# Patient Record
Sex: MALE | Race: ASIAN | NOT HISPANIC OR LATINO | Employment: PART TIME | URBAN - METROPOLITAN AREA
[De-identification: names, ages, dates, MRNs, and addresses within clinical notes are randomized per-mention and may not be internally consistent; named-entity substitution may affect disease eponyms.]

---

## 2017-01-25 ENCOUNTER — OFFICE VISIT - HEALTHEAST (OUTPATIENT)
Dept: FAMILY MEDICINE | Facility: CLINIC | Age: 37
End: 2017-01-25

## 2017-01-25 DIAGNOSIS — K58.0 IRRITABLE BOWEL SYNDROME WITH DIARRHEA: ICD-10-CM

## 2017-01-25 ASSESSMENT — MIFFLIN-ST. JEOR: SCORE: 1665.61

## 2017-02-03 ENCOUNTER — OFFICE VISIT - HEALTHEAST (OUTPATIENT)
Dept: FAMILY MEDICINE | Facility: CLINIC | Age: 37
End: 2017-02-03

## 2017-02-03 DIAGNOSIS — J02.0 STREP PHARYNGITIS: ICD-10-CM

## 2017-02-03 DIAGNOSIS — J02.9 SORETHROAT: ICD-10-CM

## 2017-02-03 ASSESSMENT — MIFFLIN-ST. JEOR: SCORE: 1633.62

## 2017-03-20 ENCOUNTER — OFFICE VISIT - HEALTHEAST (OUTPATIENT)
Dept: FAMILY MEDICINE | Facility: CLINIC | Age: 37
End: 2017-03-20

## 2017-03-20 DIAGNOSIS — R11.2 NON-INTRACTABLE VOMITING WITH NAUSEA, UNSPECIFIED VOMITING TYPE: ICD-10-CM

## 2017-04-21 ENCOUNTER — OFFICE VISIT - HEALTHEAST (OUTPATIENT)
Dept: FAMILY MEDICINE | Facility: CLINIC | Age: 37
End: 2017-04-21

## 2017-04-21 ENCOUNTER — COMMUNICATION - HEALTHEAST (OUTPATIENT)
Dept: FAMILY MEDICINE | Facility: CLINIC | Age: 37
End: 2017-04-21

## 2017-04-21 DIAGNOSIS — A04.8 H. PYLORI INFECTION: ICD-10-CM

## 2017-04-21 ASSESSMENT — MIFFLIN-ST. JEOR: SCORE: 1666.36

## 2017-06-13 ENCOUNTER — COMMUNICATION - HEALTHEAST (OUTPATIENT)
Dept: FAMILY MEDICINE | Facility: CLINIC | Age: 37
End: 2017-06-13

## 2017-06-26 ENCOUNTER — OFFICE VISIT - HEALTHEAST (OUTPATIENT)
Dept: FAMILY MEDICINE | Facility: CLINIC | Age: 37
End: 2017-06-26

## 2017-06-26 DIAGNOSIS — K58.0 IRRITABLE BOWEL SYNDROME WITH DIARRHEA: ICD-10-CM

## 2017-06-26 DIAGNOSIS — R10.10 PAIN OF UPPER ABDOMEN: ICD-10-CM

## 2017-06-26 ASSESSMENT — MIFFLIN-ST. JEOR: SCORE: 1664.12

## 2017-06-27 ENCOUNTER — HOSPITAL ENCOUNTER (OUTPATIENT)
Dept: ULTRASOUND IMAGING | Facility: HOSPITAL | Age: 37
Discharge: HOME | End: 2017-06-27
Attending: FAMILY MEDICINE

## 2017-06-27 DIAGNOSIS — R10.10 PAIN OF UPPER ABDOMEN: ICD-10-CM

## 2017-06-30 ENCOUNTER — OFFICE VISIT - HEALTHEAST (OUTPATIENT)
Dept: FAMILY MEDICINE | Facility: CLINIC | Age: 37
End: 2017-06-30

## 2017-06-30 DIAGNOSIS — R10.10 PAIN OF UPPER ABDOMEN: ICD-10-CM

## 2017-06-30 DIAGNOSIS — R73.09 ELEVATED GLUCOSE: ICD-10-CM

## 2017-06-30 LAB — HBA1C MFR BLD: 6 % (ref 3.5–6)

## 2017-06-30 ASSESSMENT — MIFFLIN-ST. JEOR: SCORE: 1661.85

## 2017-09-13 ENCOUNTER — OFFICE VISIT - HEALTHEAST (OUTPATIENT)
Dept: FAMILY MEDICINE | Facility: CLINIC | Age: 37
End: 2017-09-13

## 2017-09-13 DIAGNOSIS — Z11.1 SCREENING FOR TUBERCULOSIS: ICD-10-CM

## 2017-09-13 ASSESSMENT — MIFFLIN-ST. JEOR: SCORE: 1573.4

## 2017-10-13 ENCOUNTER — OFFICE VISIT - HEALTHEAST (OUTPATIENT)
Dept: FAMILY MEDICINE | Facility: CLINIC | Age: 37
End: 2017-10-13

## 2017-10-13 DIAGNOSIS — Z23 NEED FOR IMMUNIZATION AGAINST INFLUENZA: ICD-10-CM

## 2017-10-13 DIAGNOSIS — R76.12 POSITIVE QUANTIFERON-TB GOLD TEST: ICD-10-CM

## 2017-10-13 DIAGNOSIS — R73.09 ELEVATED GLUCOSE: ICD-10-CM

## 2017-10-13 LAB — HBA1C MFR BLD: 5.5 % (ref 3.5–6)

## 2017-10-13 ASSESSMENT — MIFFLIN-ST. JEOR: SCORE: 1589.28

## 2017-11-08 ENCOUNTER — OFFICE VISIT - HEALTHEAST (OUTPATIENT)
Dept: FAMILY MEDICINE | Facility: CLINIC | Age: 37
End: 2017-11-08

## 2017-11-08 DIAGNOSIS — R79.89 ELEVATED LFTS: ICD-10-CM

## 2017-11-08 DIAGNOSIS — Z23 NEED FOR VACCINATION: ICD-10-CM

## 2017-11-08 ASSESSMENT — MIFFLIN-ST. JEOR: SCORE: 1599.48

## 2018-09-07 ENCOUNTER — COMMUNICATION - HEALTHEAST (OUTPATIENT)
Dept: FAMILY MEDICINE | Facility: CLINIC | Age: 38
End: 2018-09-07

## 2018-09-07 DIAGNOSIS — K58.0 IRRITABLE BOWEL SYNDROME WITH DIARRHEA: ICD-10-CM

## 2018-09-10 ENCOUNTER — COMMUNICATION - HEALTHEAST (OUTPATIENT)
Dept: FAMILY MEDICINE | Facility: CLINIC | Age: 38
End: 2018-09-10

## 2018-10-19 ENCOUNTER — COMMUNICATION - HEALTHEAST (OUTPATIENT)
Dept: SCHEDULING | Facility: CLINIC | Age: 38
End: 2018-10-19

## 2018-10-25 ENCOUNTER — COMMUNICATION - HEALTHEAST (OUTPATIENT)
Dept: TELEHEALTH | Facility: CLINIC | Age: 38
End: 2018-10-25

## 2018-10-25 ENCOUNTER — OFFICE VISIT - HEALTHEAST (OUTPATIENT)
Dept: FAMILY MEDICINE | Facility: CLINIC | Age: 38
End: 2018-10-25

## 2018-10-25 DIAGNOSIS — K58.0 IRRITABLE BOWEL SYNDROME WITH DIARRHEA: ICD-10-CM

## 2018-10-25 DIAGNOSIS — K29.70 GASTRITIS: ICD-10-CM

## 2018-10-25 DIAGNOSIS — R14.0 BLOATING SYMPTOM: ICD-10-CM

## 2018-10-25 DIAGNOSIS — L70.9 ACNE: ICD-10-CM

## 2018-10-25 DIAGNOSIS — R79.89 ELEVATED LFTS: ICD-10-CM

## 2018-10-25 LAB
ALBUMIN SERPL-MCNC: 4.2 G/DL (ref 3.5–5)
ALP SERPL-CCNC: 65 U/L (ref 45–120)
ALT SERPL W P-5'-P-CCNC: 75 U/L (ref 0–45)
AST SERPL W P-5'-P-CCNC: 32 U/L (ref 0–40)
BASOPHILS # BLD AUTO: 0 THOU/UL (ref 0–0.2)
BASOPHILS NFR BLD AUTO: 1 % (ref 0–2)
BILIRUB DIRECT SERPL-MCNC: 0.2 MG/DL
BILIRUB SERPL-MCNC: 0.7 MG/DL (ref 0–1)
EOSINOPHIL # BLD AUTO: 0.3 THOU/UL (ref 0–0.4)
EOSINOPHIL NFR BLD AUTO: 4 % (ref 0–6)
ERYTHROCYTE [DISTWIDTH] IN BLOOD BY AUTOMATED COUNT: 12 % (ref 11–14.5)
HCT VFR BLD AUTO: 50.3 % (ref 40–54)
HGB BLD-MCNC: 16.1 G/DL (ref 14–18)
LYMPHOCYTES # BLD AUTO: 1.8 THOU/UL (ref 0.8–4.4)
LYMPHOCYTES NFR BLD AUTO: 24 % (ref 20–40)
MCH RBC QN AUTO: 25.3 PG (ref 27–34)
MCHC RBC AUTO-ENTMCNC: 32.1 G/DL (ref 32–36)
MCV RBC AUTO: 79 FL (ref 80–100)
MONOCYTES # BLD AUTO: 0.4 THOU/UL (ref 0–0.9)
MONOCYTES NFR BLD AUTO: 5 % (ref 2–10)
NEUTROPHILS # BLD AUTO: 4.9 THOU/UL (ref 2–7.7)
NEUTROPHILS NFR BLD AUTO: 65 % (ref 50–70)
PLATELET # BLD AUTO: 165 THOU/UL (ref 140–440)
PMV BLD AUTO: 9.6 FL (ref 7–10)
PROT SERPL-MCNC: 7.7 G/DL (ref 6–8)
RBC # BLD AUTO: 6.38 MILL/UL (ref 4.4–6.2)
WBC: 7.5 THOU/UL (ref 4–11)

## 2018-10-26 ENCOUNTER — AMBULATORY - HEALTHEAST (OUTPATIENT)
Dept: LAB | Facility: CLINIC | Age: 38
End: 2018-10-26

## 2018-10-26 DIAGNOSIS — K58.0 IRRITABLE BOWEL SYNDROME WITH DIARRHEA: ICD-10-CM

## 2018-10-26 LAB
C DIFF TOX B STL QL: NEGATIVE
RIBOTYPE 027/NAP1/BI: NORMAL

## 2018-10-27 LAB
SHIGA TOXIN 1: NEGATIVE
SHIGA TOXIN 2: NEGATIVE

## 2018-10-29 LAB
BACTERIA SPEC CULT: NORMAL
H PYLORI AG STL QL IA: NORMAL
O+P STL MICRO: NORMAL
O+P STL MICRO: NORMAL
REPORT STATUS: NORMAL
SPECIMEN DESCRIPTION: NORMAL

## 2018-11-01 ENCOUNTER — OFFICE VISIT - HEALTHEAST (OUTPATIENT)
Dept: FAMILY MEDICINE | Facility: CLINIC | Age: 38
End: 2018-11-01

## 2018-11-01 DIAGNOSIS — R00.0 TACHYCARDIA: ICD-10-CM

## 2018-11-01 DIAGNOSIS — K58.0 IRRITABLE BOWEL SYNDROME WITH DIARRHEA: ICD-10-CM

## 2018-11-01 DIAGNOSIS — R79.89 ELEVATED LFTS: ICD-10-CM

## 2018-11-01 DIAGNOSIS — R06.00 DYSPNEA: ICD-10-CM

## 2018-11-01 LAB
ATRIAL RATE - MUSE: 95 BPM
DIASTOLIC BLOOD PRESSURE - MUSE: NORMAL MMHG
INTERPRETATION ECG - MUSE: NORMAL
P AXIS - MUSE: 71 DEGREES
PR INTERVAL - MUSE: 160 MS
QRS DURATION - MUSE: 80 MS
QT - MUSE: 332 MS
QTC - MUSE: 417 MS
R AXIS - MUSE: 79 DEGREES
SYSTOLIC BLOOD PRESSURE - MUSE: NORMAL MMHG
T AXIS - MUSE: 50 DEGREES
TSH SERPL DL<=0.005 MIU/L-ACNC: 0.9 UIU/ML (ref 0.3–5)
VENTRICULAR RATE- MUSE: 95 BPM

## 2018-11-01 ASSESSMENT — MIFFLIN-ST. JEOR: SCORE: 1596.65

## 2018-11-02 ENCOUNTER — RECORDS - HEALTHEAST (OUTPATIENT)
Dept: ADMINISTRATIVE | Facility: OTHER | Age: 38
End: 2018-11-02

## 2018-11-06 ENCOUNTER — RECORDS - HEALTHEAST (OUTPATIENT)
Dept: ADMINISTRATIVE | Facility: OTHER | Age: 38
End: 2018-11-06

## 2018-11-12 ENCOUNTER — RECORDS - HEALTHEAST (OUTPATIENT)
Dept: ADMINISTRATIVE | Facility: OTHER | Age: 38
End: 2018-11-12

## 2018-11-13 ENCOUNTER — COMMUNICATION - HEALTHEAST (OUTPATIENT)
Dept: FAMILY MEDICINE | Facility: CLINIC | Age: 38
End: 2018-11-13

## 2018-11-27 ENCOUNTER — OFFICE VISIT - HEALTHEAST (OUTPATIENT)
Dept: FAMILY MEDICINE | Facility: CLINIC | Age: 38
End: 2018-11-27

## 2018-11-27 DIAGNOSIS — R53.82 CHRONIC FATIGUE: ICD-10-CM

## 2018-11-27 DIAGNOSIS — R79.89 ABNORMAL LIVER FUNCTION TESTS: ICD-10-CM

## 2018-11-27 DIAGNOSIS — R06.02 SOB (SHORTNESS OF BREATH): ICD-10-CM

## 2018-11-27 DIAGNOSIS — R21 RASH: ICD-10-CM

## 2018-11-27 DIAGNOSIS — R06.2 WHEEZING: ICD-10-CM

## 2018-11-27 LAB
ALBUMIN SERPL-MCNC: 4.5 G/DL (ref 3.5–5)
ALP SERPL-CCNC: 86 U/L (ref 45–120)
ALT SERPL W P-5'-P-CCNC: 90 U/L (ref 0–45)
ANION GAP SERPL CALCULATED.3IONS-SCNC: 10 MMOL/L (ref 5–18)
AST SERPL W P-5'-P-CCNC: 41 U/L (ref 0–40)
BASOPHILS # BLD AUTO: 0 THOU/UL (ref 0–0.2)
BASOPHILS NFR BLD AUTO: 0 % (ref 0–2)
BILIRUB SERPL-MCNC: 1.3 MG/DL (ref 0–1)
BUN SERPL-MCNC: 11 MG/DL (ref 8–22)
CALCIUM SERPL-MCNC: 10.4 MG/DL (ref 8.5–10.5)
CHLORIDE BLD-SCNC: 105 MMOL/L (ref 98–107)
CO2 SERPL-SCNC: 27 MMOL/L (ref 22–31)
CREAT SERPL-MCNC: 1.02 MG/DL (ref 0.7–1.3)
EOSINOPHIL # BLD AUTO: 0.2 THOU/UL (ref 0–0.4)
EOSINOPHIL NFR BLD AUTO: 4 % (ref 0–6)
ERYTHROCYTE [DISTWIDTH] IN BLOOD BY AUTOMATED COUNT: 14.6 % (ref 11–14.5)
FERRITIN SERPL-MCNC: 238 NG/ML (ref 27–300)
GFR SERPL CREATININE-BSD FRML MDRD: >60 ML/MIN/1.73M2
GLUCOSE BLD-MCNC: 90 MG/DL (ref 70–125)
HBA1C MFR BLD: 5.7 % (ref 3.5–6)
HCT VFR BLD AUTO: 56.3 % (ref 40–54)
HGB BLD-MCNC: 17.9 G/DL (ref 14–18)
LYMPHOCYTES # BLD AUTO: 0.9 THOU/UL (ref 0.8–4.4)
LYMPHOCYTES NFR BLD AUTO: 18 % (ref 20–40)
MCH RBC QN AUTO: 25.1 PG (ref 27–34)
MCHC RBC AUTO-ENTMCNC: 31.8 G/DL (ref 32–36)
MCV RBC AUTO: 79 FL (ref 80–100)
MONOCYTES # BLD AUTO: 0.4 THOU/UL (ref 0–0.9)
MONOCYTES NFR BLD AUTO: 8 % (ref 2–10)
NEUTROPHILS # BLD AUTO: 3.7 THOU/UL (ref 2–7.7)
NEUTROPHILS NFR BLD AUTO: 71 % (ref 50–70)
PLATELET # BLD AUTO: 230 THOU/UL (ref 140–440)
PMV BLD AUTO: 11.2 FL (ref 8.5–12.5)
POTASSIUM BLD-SCNC: 3.9 MMOL/L (ref 3.5–5)
PROT SERPL-MCNC: 8.4 G/DL (ref 6–8)
RBC # BLD AUTO: 7.12 MILL/UL (ref 4.4–6.2)
SODIUM SERPL-SCNC: 142 MMOL/L (ref 136–145)
TSH SERPL DL<=0.005 MIU/L-ACNC: 0.63 UIU/ML (ref 0.3–5)
WBC: 5.2 THOU/UL (ref 4–11)

## 2018-11-27 ASSESSMENT — MIFFLIN-ST. JEOR: SCORE: 1592.48

## 2018-11-29 LAB
A ALTERNATA IGE QN: <0.35 KU/L
A FUMIGATUS IGE QN: <0.35 KU/L
ALLERGEN HOUSE DUST GREER: <0.35 KU/L
ALLERGEN HOUSE DUST HOLLISTER: <0.35 KU/L
ALLERGEN HOUSE DUST HOLLISTER: <0.35 KU/L
C HERBARUM IGE QN: <0.35 KU/L
CAT DANDER IGG QN: <0.35 KU/L
COCKSFOOT IGE QN: <0.35 KU/L
COMMON RAGWEED IGE QN: <0.35 KU/L
D FARINAE IGE QN: <0.35 KU/L
DOG DANDER+EPITH IGE QN: <0.35 KU/L
ENGL PLANTAIN IGE QN: <0.35 KU/L
GIANT RAGWEED IGE QN: <0.35 KU/L
KENT BLUE GRASS IGE QN: <0.35 KU/L
LTX IGE QN: <0.35 KU/L
MAPLE IGE QN: <0.35 KU/L
TIMOTHY IGE QN: <0.35 KU/L
WHITE ELM IGE QN: <0.35 KU/L
WHITE OAK IGE QN: <0.35 KU/L

## 2018-12-05 ENCOUNTER — TELEPHONE (OUTPATIENT)
Dept: OTHER | Facility: CLINIC | Age: 38
End: 2018-12-05

## 2018-12-24 ENCOUNTER — COMMUNICATION - HEALTHEAST (OUTPATIENT)
Dept: SCHEDULING | Facility: CLINIC | Age: 38
End: 2018-12-24

## 2018-12-24 ENCOUNTER — COMMUNICATION - HEALTHEAST (OUTPATIENT)
Dept: FAMILY MEDICINE | Facility: CLINIC | Age: 38
End: 2018-12-24

## 2018-12-24 DIAGNOSIS — R06.2 WHEEZING: ICD-10-CM

## 2018-12-24 DIAGNOSIS — R06.02 SOB (SHORTNESS OF BREATH): ICD-10-CM

## 2018-12-26 ENCOUNTER — COMMUNICATION - HEALTHEAST (OUTPATIENT)
Dept: SCHEDULING | Facility: CLINIC | Age: 38
End: 2018-12-26

## 2018-12-31 ENCOUNTER — OFFICE VISIT - HEALTHEAST (OUTPATIENT)
Dept: FAMILY MEDICINE | Facility: CLINIC | Age: 38
End: 2018-12-31

## 2018-12-31 DIAGNOSIS — R11.11 NON-INTRACTABLE VOMITING WITHOUT NAUSEA, UNSPECIFIED VOMITING TYPE: ICD-10-CM

## 2018-12-31 ASSESSMENT — MIFFLIN-ST. JEOR: SCORE: 1573.97

## 2019-01-03 ENCOUNTER — OFFICE VISIT - HEALTHEAST (OUTPATIENT)
Dept: FAMILY MEDICINE | Facility: CLINIC | Age: 39
End: 2019-01-03

## 2019-01-03 DIAGNOSIS — K58.0 IRRITABLE BOWEL SYNDROME WITH DIARRHEA: ICD-10-CM

## 2019-01-03 DIAGNOSIS — R79.89 ABNORMAL LIVER FUNCTION TESTS: ICD-10-CM

## 2019-01-03 DIAGNOSIS — J98.01 BRONCHOSPASM: ICD-10-CM

## 2019-01-03 DIAGNOSIS — G47.00 INSOMNIA, UNSPECIFIED TYPE: ICD-10-CM

## 2019-01-03 ASSESSMENT — MIFFLIN-ST. JEOR: SCORE: 1578.51

## 2019-02-26 ENCOUNTER — OFFICE VISIT - HEALTHEAST (OUTPATIENT)
Dept: FAMILY MEDICINE | Facility: CLINIC | Age: 39
End: 2019-02-26

## 2019-02-26 DIAGNOSIS — J02.9 SORE THROAT: ICD-10-CM

## 2019-02-26 DIAGNOSIS — J02.0 ACUTE STREPTOCOCCAL PHARYNGITIS: ICD-10-CM

## 2019-02-26 LAB — DEPRECATED S PYO AG THROAT QL EIA: ABNORMAL

## 2019-02-26 ASSESSMENT — MIFFLIN-ST. JEOR: SCORE: 1557.5

## 2019-03-27 ENCOUNTER — OFFICE VISIT - HEALTHEAST (OUTPATIENT)
Dept: FAMILY MEDICINE | Facility: CLINIC | Age: 39
End: 2019-03-27

## 2019-03-27 DIAGNOSIS — L70.9 ACNE: ICD-10-CM

## 2019-03-27 DIAGNOSIS — R11.2 NON-INTRACTABLE VOMITING WITH NAUSEA, UNSPECIFIED VOMITING TYPE: ICD-10-CM

## 2019-03-27 LAB
ALBUMIN SERPL-MCNC: 4.6 G/DL (ref 3.5–5)
ALP SERPL-CCNC: 71 U/L (ref 45–120)
ALT SERPL W P-5'-P-CCNC: 46 U/L (ref 0–45)
AST SERPL W P-5'-P-CCNC: 26 U/L (ref 0–40)
BILIRUB DIRECT SERPL-MCNC: 0.3 MG/DL
BILIRUB SERPL-MCNC: 1 MG/DL (ref 0–1)
ERYTHROCYTE [DISTWIDTH] IN BLOOD BY AUTOMATED COUNT: 11.3 % (ref 11–14.5)
HCT VFR BLD AUTO: 52 % (ref 40–54)
HGB BLD-MCNC: 17 G/DL (ref 14–18)
LIPASE SERPL-CCNC: 15 U/L (ref 0–52)
MCH RBC QN AUTO: 25.5 PG (ref 27–34)
MCHC RBC AUTO-ENTMCNC: 32.6 G/DL (ref 32–36)
MCV RBC AUTO: 78 FL (ref 80–100)
PLATELET # BLD AUTO: 188 THOU/UL (ref 140–440)
PMV BLD AUTO: 9 FL (ref 7–10)
PROT SERPL-MCNC: 8.2 G/DL (ref 6–8)
RBC # BLD AUTO: 6.65 MILL/UL (ref 4.4–6.2)
WBC: 4.4 THOU/UL (ref 4–11)

## 2019-03-27 ASSESSMENT — MIFFLIN-ST. JEOR: SCORE: 1550.72

## 2019-04-12 ENCOUNTER — RECORDS - HEALTHEAST (OUTPATIENT)
Dept: GENERAL RADIOLOGY | Facility: CLINIC | Age: 39
End: 2019-04-12

## 2019-04-12 ENCOUNTER — OFFICE VISIT - HEALTHEAST (OUTPATIENT)
Dept: FAMILY MEDICINE | Facility: CLINIC | Age: 39
End: 2019-04-12

## 2019-04-12 DIAGNOSIS — R11.2 NON-INTRACTABLE VOMITING WITH NAUSEA, UNSPECIFIED VOMITING TYPE: ICD-10-CM

## 2019-04-12 DIAGNOSIS — K58.0 IRRITABLE BOWEL SYNDROME WITH DIARRHEA: ICD-10-CM

## 2019-04-12 DIAGNOSIS — R10.84 GENERALIZED ABDOMINAL PAIN: ICD-10-CM

## 2019-04-12 DIAGNOSIS — R14.0 BLOATING SYMPTOM: ICD-10-CM

## 2019-04-12 DIAGNOSIS — R10.84 ABDOMINAL PAIN, GENERALIZED: ICD-10-CM

## 2019-04-12 DIAGNOSIS — R19.7 DIARRHEA, UNSPECIFIED: ICD-10-CM

## 2019-04-12 DIAGNOSIS — R79.89 ABNORMAL LIVER FUNCTION TESTS: ICD-10-CM

## 2019-04-12 DIAGNOSIS — R19.7 VOMITING AND DIARRHEA: ICD-10-CM

## 2019-04-12 DIAGNOSIS — R14.0 ABDOMINAL DISTENSION (GASEOUS): ICD-10-CM

## 2019-04-12 DIAGNOSIS — R11.10 VOMITING AND DIARRHEA: ICD-10-CM

## 2019-04-12 DIAGNOSIS — R11.10 VOMITING, UNSPECIFIED: ICD-10-CM

## 2019-04-12 LAB
ALBUMIN SERPL-MCNC: 4.8 G/DL (ref 3.5–5)
ALBUMIN UR-MCNC: NEGATIVE MG/DL
ALP SERPL-CCNC: 68 U/L (ref 45–120)
ALT SERPL W P-5'-P-CCNC: 77 U/L (ref 0–45)
AMYLASE SERPL-CCNC: 80 U/L (ref 5–120)
ANION GAP SERPL CALCULATED.3IONS-SCNC: 14 MMOL/L (ref 5–18)
APPEARANCE UR: CLEAR
AST SERPL W P-5'-P-CCNC: 42 U/L (ref 0–40)
BASOPHILS # BLD AUTO: 0 THOU/UL (ref 0–0.2)
BASOPHILS NFR BLD AUTO: 1 % (ref 0–2)
BILIRUB SERPL-MCNC: 1 MG/DL (ref 0–1)
BILIRUB UR QL STRIP: NEGATIVE
BUN SERPL-MCNC: 13 MG/DL (ref 8–22)
C REACTIVE PROTEIN LHE: <0.1 MG/DL (ref 0–0.8)
CALCIUM SERPL-MCNC: 10.3 MG/DL (ref 8.5–10.5)
CHLORIDE BLD-SCNC: 103 MMOL/L (ref 98–107)
CO2 SERPL-SCNC: 24 MMOL/L (ref 22–31)
COLOR UR AUTO: YELLOW
CREAT SERPL-MCNC: 1.05 MG/DL (ref 0.7–1.3)
EOSINOPHIL # BLD AUTO: 0.2 THOU/UL (ref 0–0.4)
EOSINOPHIL NFR BLD AUTO: 2 % (ref 0–6)
ERYTHROCYTE [DISTWIDTH] IN BLOOD BY AUTOMATED COUNT: 13.5 % (ref 11–14.5)
ERYTHROCYTE [SEDIMENTATION RATE] IN BLOOD BY WESTERGREN METHOD: 2 MM/HR (ref 0–15)
GFR SERPL CREATININE-BSD FRML MDRD: >60 ML/MIN/1.73M2
GLUCOSE BLD-MCNC: 107 MG/DL (ref 70–125)
GLUCOSE UR STRIP-MCNC: NEGATIVE MG/DL
HCT VFR BLD AUTO: 52 % (ref 40–54)
HGB BLD-MCNC: 16.4 G/DL (ref 14–18)
HGB UR QL STRIP: NEGATIVE
KETONES UR STRIP-MCNC: ABNORMAL MG/DL
LEUKOCYTE ESTERASE UR QL STRIP: NEGATIVE
LYMPHOCYTES # BLD AUTO: 1.5 THOU/UL (ref 0.8–4.4)
LYMPHOCYTES NFR BLD AUTO: 23 % (ref 20–40)
MCH RBC QN AUTO: 25.2 PG (ref 27–34)
MCHC RBC AUTO-ENTMCNC: 31.5 G/DL (ref 32–36)
MCV RBC AUTO: 80 FL (ref 80–100)
MONOCYTES # BLD AUTO: 0.4 THOU/UL (ref 0–0.9)
MONOCYTES NFR BLD AUTO: 6 % (ref 2–10)
NEUTROPHILS # BLD AUTO: 4.6 THOU/UL (ref 2–7.7)
NEUTROPHILS NFR BLD AUTO: 69 % (ref 50–70)
NITRATE UR QL: NEGATIVE
PH UR STRIP: 5.5 [PH] (ref 5–8)
PLATELET # BLD AUTO: 215 THOU/UL (ref 140–440)
PMV BLD AUTO: 12.1 FL (ref 8.5–12.5)
POTASSIUM BLD-SCNC: 3.4 MMOL/L (ref 3.5–5)
PROT SERPL-MCNC: 8.2 G/DL (ref 6–8)
RBC # BLD AUTO: 6.51 MILL/UL (ref 4.4–6.2)
SODIUM SERPL-SCNC: 141 MMOL/L (ref 136–145)
SP GR UR STRIP: 1.02 (ref 1–1.03)
UROBILINOGEN UR STRIP-ACNC: ABNORMAL
WBC: 6.7 THOU/UL (ref 4–11)

## 2019-04-12 ASSESSMENT — MIFFLIN-ST. JEOR: SCORE: 1525.77

## 2019-04-15 ENCOUNTER — COMMUNICATION - HEALTHEAST (OUTPATIENT)
Dept: FAMILY MEDICINE | Facility: CLINIC | Age: 39
End: 2019-04-15

## 2019-04-15 DIAGNOSIS — K58.0 IRRITABLE BOWEL SYNDROME WITH DIARRHEA: ICD-10-CM

## 2019-04-15 DIAGNOSIS — R63.4 WEIGHT LOSS: ICD-10-CM

## 2019-04-16 ENCOUNTER — AMBULATORY - HEALTHEAST (OUTPATIENT)
Dept: LAB | Facility: CLINIC | Age: 39
End: 2019-04-16

## 2019-04-16 DIAGNOSIS — R63.4 WEIGHT LOSS: ICD-10-CM

## 2019-04-16 DIAGNOSIS — K58.0 IRRITABLE BOWEL SYNDROME WITH DIARRHEA: ICD-10-CM

## 2019-04-17 LAB
CLAM IGE QN: <0.35 KU/L
EGG WHITE IGE QN: <0.35 KU/L
LOBSTER IGE QN: <0.35 KU/L
PEANUT IGE QN: <0.35 KU/L
SALMON IGE QN: <0.35 KU/L
SCALLOP IGE QN: <0.35 KU/L
SHRIMP IGE QN: <0.35 KU/L
TUNA IGE QN: <0.35 KU/L
WHEAT IGE QN: <0.35 KU/L
WHOLE EGG IGE QN: <0.35 KU/L

## 2019-04-18 LAB
GLIADIN IGA SER-ACNC: 0.9 U/ML
GLIADIN IGG SER-ACNC: <0.4 U/ML
IGA SERPL-MCNC: 227 MG/DL (ref 65–400)
TTG IGA SER-ACNC: 0.3 U/ML
TTG IGG SER-ACNC: <0.6 U/ML

## 2019-04-19 LAB — MILK IGE QN: <0.35 KU/L

## 2019-04-20 LAB — COW WHEY IGE QN: <0.1 KU(A)/L

## 2019-04-26 ENCOUNTER — RECORDS - HEALTHEAST (OUTPATIENT)
Dept: ADMINISTRATIVE | Facility: OTHER | Age: 39
End: 2019-04-26

## 2019-05-03 ENCOUNTER — OFFICE VISIT - HEALTHEAST (OUTPATIENT)
Dept: FAMILY MEDICINE | Facility: CLINIC | Age: 39
End: 2019-05-03

## 2019-05-03 DIAGNOSIS — F51.01 PRIMARY INSOMNIA: ICD-10-CM

## 2019-05-03 ASSESSMENT — MIFFLIN-ST. JEOR: SCORE: 1543.92

## 2019-05-05 ENCOUNTER — COMMUNICATION - HEALTHEAST (OUTPATIENT)
Dept: FAMILY MEDICINE | Facility: CLINIC | Age: 39
End: 2019-05-05

## 2019-05-17 ENCOUNTER — COMMUNICATION - HEALTHEAST (OUTPATIENT)
Dept: NURSING | Facility: CLINIC | Age: 39
End: 2019-05-17

## 2019-05-20 ENCOUNTER — AMBULATORY - HEALTHEAST (OUTPATIENT)
Dept: CARE COORDINATION | Facility: CLINIC | Age: 39
End: 2019-05-20

## 2019-05-20 ENCOUNTER — COMMUNICATION - HEALTHEAST (OUTPATIENT)
Dept: NURSING | Facility: CLINIC | Age: 39
End: 2019-05-20

## 2019-05-20 DIAGNOSIS — Z59.71 INSURANCE COVERAGE PROBLEMS: ICD-10-CM

## 2019-05-21 ENCOUNTER — COMMUNICATION - HEALTHEAST (OUTPATIENT)
Dept: CARE COORDINATION | Facility: CLINIC | Age: 39
End: 2019-05-21

## 2019-06-05 ENCOUNTER — COMMUNICATION - HEALTHEAST (OUTPATIENT)
Dept: CARE COORDINATION | Facility: CLINIC | Age: 39
End: 2019-06-05

## 2019-06-09 ENCOUNTER — HOSPITAL ENCOUNTER (EMERGENCY)
Facility: CLINIC | Age: 39
Discharge: HOME | End: 2019-06-10
Attending: EMERGENCY MEDICINE | Admitting: EMERGENCY MEDICINE
Payer: COMMERCIAL

## 2019-06-09 ENCOUNTER — TRANSFERRED RECORDS (OUTPATIENT)
Dept: HEALTH INFORMATION MANAGEMENT | Facility: CLINIC | Age: 39
End: 2019-06-09

## 2019-06-09 DIAGNOSIS — T17.208A FOREIGN BODY IN PHARYNX, INITIAL ENCOUNTER: ICD-10-CM

## 2019-06-09 PROCEDURE — 31577 LARGSC W/RMVL FOREIGN BDY(S): CPT | Performed by: EMERGENCY MEDICINE

## 2019-06-09 PROCEDURE — 99284 EMERGENCY DEPT VISIT MOD MDM: CPT | Mod: Z6 | Performed by: EMERGENCY MEDICINE

## 2019-06-09 PROCEDURE — 99284 EMERGENCY DEPT VISIT MOD MDM: CPT | Mod: 25 | Performed by: EMERGENCY MEDICINE

## 2019-06-09 RX ORDER — DICYCLOMINE HCL 20 MG
20 TABLET ORAL
COMMUNITY

## 2019-06-09 NOTE — ED AVS SNAPSHOT
[facility], [place], Emergency Department  [address] [address]  Phone:  [phone]                                     Cassy   MRN: [id number]    Department:  [facility], Emergency Department   Date of Visit:  6/9/2019           After Visit Summary Signature Page    I have received my discharge instructions, and my questions have been answered. I have discussed any challenges I see with this plan with the nurse or doctor.    ..........................................................................................................................................  Patient/Patient Representative Signature      ..........................................................................................................................................  Patient Representative Print Name and Relationship to Patient    ..................................................               ................................................  Date                                   Time    ..........................................................................................................................................  Reviewed by Signature/Title    ...................................................              ..............................................  Date                                               Time          22EPIC Rev 08/18

## 2019-06-10 VITALS
OXYGEN SATURATION: 97 % | DIASTOLIC BLOOD PRESSURE: 74 MMHG | TEMPERATURE: 98.8 F | BODY MASS INDEX: 26.31 KG/M2 | WEIGHT: 158.1 LBS | HEART RATE: 90 BPM | SYSTOLIC BLOOD PRESSURE: 111 MMHG

## 2019-06-10 RX ORDER — LIDOCAINE HYDROCHLORIDE 10 MG/ML
INJECTION, SOLUTION EPIDURAL; INFILTRATION; INTRACAUDAL; PERINEURAL
Status: DISCONTINUED
Start: 2019-06-10 | End: 2019-06-10 | Stop reason: HOSPADM

## 2019-06-10 ASSESSMENT — ENCOUNTER SYMPTOMS
FEVER: 0
VOMITING: 0
ABDOMINAL PAIN: 0
SHORTNESS OF BREATH: 0

## 2019-06-10 NOTE — ED TRIAGE NOTES
Pt states his neck hurts when he swallows water he was evaluated at [facility] already today. Sent here for ENT maricruz

## 2019-06-10 NOTE — ED PROVIDER NOTES
History     Chief Complaint   Patient presents with     Airway Obstruction     HPI  Tobias Madera is a 39 year old male who was sent over from an outside ED with a foreign body in his throat, apparently 1 day ago he ate chicken, felt like something stuck in his throat. He s been having constant discomfort since then and often times is spitting his saliva secondary to this. No difficulty breathing. No vomiting. No other complaints. He did have a soft tissue neck x-ray at the outside facility which showed a curvilinear density extending form the aryepiglottic fold to the posterior pharynx. Measuring approximately 2 mm in diameter and 3.8 cm in length. Apparently outside ENT did not feel comfortable retrieving this presumed chicken bone and he was sent here for evaluation by out ENT.   This part of the medical record was transcribed by Cecily Byrne, Medical Scribe, from a dictation done by Dr. Tejeda.  I have reviewed the Medications, Allergies, Past Medical and Surgical History, and Social History in the [employer] system.  History reviewed. No pertinent past medical history.    History reviewed. No pertinent surgical history.    History reviewed. No pertinent family history.    Social History     Tobacco Use     Smoking status: Former Smoker     Smokeless tobacco: Never Used   Substance Use Topics     Alcohol use: No       No current facility-administered medications for this encounter.      Current Outpatient Medications   Medication     amitriptyline (ELAVIL) 25 MG tablet     amoxicillin-clavulanate (AUGMENTIN) 875-125 MG tablet     dicyclomine (BENTYL) 20 MG tablet      No Known Allergies    Review of Systems   Constitutional: Negative for fever.   HENT:        Throat discomfort   Respiratory: Negative for shortness of breath.    Cardiovascular: Negative for chest pain.   Gastrointestinal: Negative for abdominal pain and vomiting.   All other systems reviewed and are negative.      Physical Exam   BP: 122/81  Pulse:  106  Temp: 98.8  F (37.1  C)  Weight: 71.7 kg (158 lb 1.6 oz)  SpO2: 99 %      Physical Exam   Constitutional: He appears distressed (Looks uncomfortable).   HENT:   Head: Atraumatic.   Mouth/Throat: Oropharynx is clear and moist.   Eyes: No scleral icterus.   Cardiovascular: Normal heart sounds and intact distal pulses.   Pulmonary/Chest: Breath sounds normal. No respiratory distress.   Abdominal: Soft. There is no tenderness.   Musculoskeletal: He exhibits no edema or tenderness.   Skin: Skin is warm. No rash noted. He is not diaphoretic.       ED Course        Procedures             Critical Care time:  none      Please see ENT note for full details of procedure.  I did supervise the entire procedure.       Labs Ordered and Resulted from Time of ED Arrival Up to the Time of Departure from the ED - No data to display         Assessments & Plan (with Medical Decision Making)   ENT did come to see the patient, was able to remove the chicken bone at bedside.  It did seem to be lodged in the base of the tongue, so we will cover with Augmentin for 7 days.  The patient's symptoms have resolved and he is feeling well at this point.  No complications.  He will be discharged home, given ENT phone number he should follow-up with them as needed.  He is encouraged to return to the ER for new or worsening symptoms.  He verbalizes understanding is agreeable to plan.    Dictation Disclaimer: Some of this Note has been completed with voice-recognition dictation software. Although errors are generally corrected real-time, there is the potential for a rare error to be present in the completed chart.      I have reviewed the nursing notes.    I have reviewed the findings, diagnosis, plan and need for follow up with the patient.       Medication List      Started    amoxicillin-clavulanate 875-125 MG tablet  Commonly known as:  AUGMENTIN  1 tablet, Oral, 2 TIMES DAILY            Final diagnoses:   Foreign body in pharynx, initial  encounter       6/9/2019   [facility], [place], EMERGENCY DEPARTMENT     Kimberley Tejeda MD  06/10/19 0254

## 2019-06-10 NOTE — CONSULTS
Otolaryngology Consult Note  Marsha 10, 2019      CC: sore throat, globus    HPI: Tobias Madera is a 39 year old male with history of insomnia and chronic diarrhea presents as transfer from [facility] ED for evaluation and management of an oropharynx foreign body. Patient reports he was eating chicken for dinner the evening of June 8th when he felt something stick in his throat. He continued on with this sensation, but developed worsening pain with swallowing to the point where he was spitting out his secretions. He has not had any trouble breathing, No fevers, chills, or sweats. Patient was seen at [facility] ED where AP and lateral neck films were notable for a radiopaque FB consistent with chicken bone.     History reviewed. No pertinent past medical history.    History reviewed. No pertinent surgical history.    Current Outpatient Medications   Medication Sig Dispense Refill     amitriptyline (ELAVIL) 25 MG tablet Take 25 mg by mouth       dicyclomine (BENTYL) 20 MG tablet Take 20 mg by mouth          No Known Allergies    Social History     Socioeconomic History     Marital status:      Spouse name: Not on file     Number of children: Not on file     Years of education: Not on file     Highest education level: Not on file   Occupational History     Not on file   Social Needs     Financial resource strain: Not on file     Food insecurity:     Worry: Not on file     Inability: Not on file     Transportation needs:     Medical: Not on file     Non-medical: Not on file   Tobacco Use     Smoking status: Former Smoker     Smokeless tobacco: Never Used   Substance and Sexual Activity     Alcohol use: No     Drug use: Not on file     Sexual activity: Not on file   Lifestyle     Physical activity:     Days per week: Not on file     Minutes per session: Not on file     Stress: Not on file   Relationships     Social connections:     Talks on phone: Not on file     Gets together: Not on file     Attends [employer] service: Not  on file     Active member of club or organization: Not on file     Attends meetings of clubs or organizations: Not on file     Relationship status: Not on file     Intimate partner violence:     Fear of current or ex partner: Not on file     Emotionally abused: Not on file     Physically abused: Not on file     Forced sexual activity: Not on file   Other Topics Concern     Not on file   Social History Narrative     Not on file       History reviewed. No pertinent family history.    ROS: 12 point review of systems is negative unless noted in HPI.    PHYSICAL EXAM:  General: laying in bed, no acute distress  /81   Pulse 103   Temp 98.8  F (37.1  C) (Oral)   Wt 71.7 kg (158 lb 1.6 oz)   SpO2 100%   BMI 26.31 kg/m    HEAD: normocephalic, atraumatic  Face: symmetrical, no swelling, edema, or erythema, no facial droop  Eyes: EOMI without spontaneous or gaze evoked nystagmus, PERRL, clear sclera  Ears: no tragal tenderness, external ear canal open and clear bilaterally, TMs clear bilaterally  Nose: no anterior drainage, intact and midline septum without perforation or hematoma   Mouth: moist, no ulcers, no jaw or tooth tenderness, tongue midline and symmetric  Oropharynx: tonsils within normal limits, uvula midline, no oropharyngeal erythema  Neck: no LAD, trach midline  Neuro: cranial nerves 2-12 grossly intact    FIBEROPTIC ENDOSCOPY AND FOREIGN BODY REMOVAL:  Due to foreign body, fiberoptic laryngoscopy was indicated. After obtaining verbal consent, the nose was topically decongested and anesthetized. The fiberoptic laryngoscope was passed under endoscopic vision through the left nasal passage. The turbinates were normal. The inferior and middle meati were clear bilaterally without purulence, masses, or polyps. The nasopharynx was clear. The eustachian tubes were clear. The soft palate appeared normal with good mobility. The epiglottis was sharp, and the visualized portion of the vallecula was clear. There is  a chicken bone lodged in the tongue base and against the posterior pharyngeal wall. The larynx was clear with mobile cords. The arytenoids were clear, and there was no pooling in the hypopharynx. The oropharynx was then anesthetized topically with 1% lidocaine. With the assistance of Dr. Tejeda, ED attending, driving the scope, a Yazan forceps was used to grasp the anterior part of the chicken bone and extract it from the oropharynx. THere was minimal bleeding, The upper airway was again examined and there was no further evidence of foreign bodies. Patient tolerated this procedure well.     ROUTINE IP LABS (Last four results)  BMPNo lab results found in last 7 days.  CBCNo lab results found in last 7 days.  INRNo lab results found in last 7 days.    Imaging:  Outside AP and lateral neck films:  EXAM: XR NECK SOFT TISSUE    LOCATION: [facility]    DATE/TIME: 6/9/2019 7:01 PM        INDICATION: possible swallowed foreign body    COMPARISON: None.        FINDINGS: No prevertebral edema. Normal appearance of the epiglottis and larynx. No airway compromise. A curvilinear density is seen extending from the aryepiglottic fold to the posterior pharynx. It measures approximately 2 mm in diameter and 3.8 cm in     length.        Assessment and Plan  Tobias Madera is a 39 year old male with a past medical history noted above who presents with a chicken bone foreign body in the oropharynx. This was removed in the ED with topical anesthesia.   - Patient to be monitored for 30-60 minutes post FB removal  - Recommend 5 days Augmentin due to duration of foreign body > 24 hours  - Instructed patient to follow a soft diet for the next 48-72 hours  - Patient instructed to return to ED with worsening pain, chest pain, fevers, etc.   - follow up with ENT FITO Johnson MD PGY4  Otolaryngology-Head & Neck Surgery  Please page ENT with questions by dialing [phone] and entering job code [phone] when prompted.

## 2019-07-22 ENCOUNTER — RECORDS - HEALTHEAST (OUTPATIENT)
Dept: ADMINISTRATIVE | Facility: OTHER | Age: 39
End: 2019-07-22

## 2019-09-03 ENCOUNTER — OFFICE VISIT - HEALTHEAST (OUTPATIENT)
Dept: FAMILY MEDICINE | Facility: CLINIC | Age: 39
End: 2019-09-03

## 2019-09-03 DIAGNOSIS — F51.01 PRIMARY INSOMNIA: ICD-10-CM

## 2019-09-03 ASSESSMENT — PATIENT HEALTH QUESTIONNAIRE - PHQ9: SUM OF ALL RESPONSES TO PHQ QUESTIONS 1-9: 2

## 2019-09-03 ASSESSMENT — MIFFLIN-ST. JEOR: SCORE: 1584.44

## 2019-12-16 ENCOUNTER — OFFICE VISIT - HEALTHEAST (OUTPATIENT)
Dept: FAMILY MEDICINE | Facility: CLINIC | Age: 39
End: 2019-12-16

## 2019-12-16 DIAGNOSIS — J02.9 SORE THROAT: ICD-10-CM

## 2019-12-16 LAB — DEPRECATED S PYO AG THROAT QL EIA: NORMAL

## 2019-12-16 ASSESSMENT — MIFFLIN-ST. JEOR: SCORE: 1627.27

## 2019-12-17 LAB — GROUP A STREP BY PCR: NORMAL

## 2020-01-29 ENCOUNTER — COMMUNICATION - HEALTHEAST (OUTPATIENT)
Dept: FAMILY MEDICINE | Facility: CLINIC | Age: 40
End: 2020-01-29

## 2020-01-29 DIAGNOSIS — K58.0 IRRITABLE BOWEL SYNDROME WITH DIARRHEA: ICD-10-CM

## 2020-05-02 ENCOUNTER — COMMUNICATION - HEALTHEAST (OUTPATIENT)
Dept: FAMILY MEDICINE | Facility: CLINIC | Age: 40
End: 2020-05-02

## 2020-05-02 DIAGNOSIS — F51.01 PRIMARY INSOMNIA: ICD-10-CM

## 2020-05-05 ENCOUNTER — COMMUNICATION - HEALTHEAST (OUTPATIENT)
Dept: SCHEDULING | Facility: CLINIC | Age: 40
End: 2020-05-05

## 2020-05-07 ENCOUNTER — COMMUNICATION - HEALTHEAST (OUTPATIENT)
Dept: SCHEDULING | Facility: CLINIC | Age: 40
End: 2020-05-07

## 2020-05-19 ENCOUNTER — COMMUNICATION - HEALTHEAST (OUTPATIENT)
Dept: EMERGENCY MEDICINE | Facility: HOSPITAL | Age: 40
End: 2020-05-19

## 2020-07-15 DIAGNOSIS — Z11.59 SCREENING FOR VIRAL DISEASE: ICD-10-CM

## 2021-02-11 ENCOUNTER — COMMUNICATION - HEALTHEAST (OUTPATIENT)
Dept: FAMILY MEDICINE | Facility: CLINIC | Age: 41
End: 2021-02-11

## 2021-02-12 ENCOUNTER — OFFICE VISIT - HEALTHEAST (OUTPATIENT)
Dept: FAMILY MEDICINE | Facility: CLINIC | Age: 41
End: 2021-02-12

## 2021-02-12 DIAGNOSIS — Z11.1 SCREENING EXAMINATION FOR PULMONARY TUBERCULOSIS: ICD-10-CM

## 2021-02-12 ASSESSMENT — MIFFLIN-ST. JEOR: SCORE: 1665.22

## 2021-02-17 LAB
GAMMA INTERFERON BACKGROUND BLD IA-ACNC: 0.12 IU/ML
M TB IFN-G BLD-IMP: POSITIVE
MITOGEN IGNF BCKGRD COR BLD-ACNC: 0.38 IU/ML
MITOGEN IGNF BCKGRD COR BLD-ACNC: 0.44 IU/ML
QTF INTERPRETATION: ABNORMAL
QTF MITOGEN - NIL: >10 IU/ML

## 2021-05-26 ASSESSMENT — PATIENT HEALTH QUESTIONNAIRE - PHQ9: SUM OF ALL RESPONSES TO PHQ QUESTIONS 1-9: 2

## 2021-05-27 NOTE — TELEPHONE ENCOUNTER
Please call  Cassy. Ask him to return for a lab-only visit. Since his first labs were normal or very close to normal, I would like to do additional testing for allergies/sensitivities plus celiac. He can come any time the lab is open and get these done.

## 2021-05-27 NOTE — PROGRESS NOTES
ASSESSMENT/PLAN:    1. Non-intractable vomiting with nausea, unspecified vomiting type  Patient has irritable bowel at baseline, but it sounds like these symptoms are quite a bit worse and different than his usual.  I was concerned about pancreatitis or other new disorder contributing to his symptoms, so I did check labs as noted but they were all came back ultimately normal.  Given his history of nonspecific gastritis on EGD, he may benefit from a brief course of ranitidine and this is so prescribed.  I would like him to follow-up with with his primary physician in about 1 month to ensure stability/improvement.  - ranitidine (ZANTAC) 300 MG capsule; Take 1 capsule (300 mg total) by mouth 2 (two) times a day.  Dispense: 28 capsule; Refill: 1  - Hepatic Profile  - HM2(CBC w/o Differential)  - Lipase    2. Acne  Refilled topical acne medicine for his request.  - clindamycin (CLINDAGEL) 1 % gel; Apply to affected area 2 times daily  Dispense: 60 g; Refill: 2      Return in about 1 month (around 4/27/2019) for with your primary care doctor.     SUBJECTIVE:  Tobias Madera is a 39 y.o. man with irritable bowel syndrome who presents with fatigue, nausea, stomach pain, vomiting.    Patient had not been feeling well in the last couple of weeks feeling really tired, his hands and feet were cold, having some nausea.  More more recently a couple of days ago he vomited and thought that maybe he was getting worse and should be seen.  He gets some stomach burning sometimes which causes him trouble breathing through his nose at night and so he only can be through his mouth.  Gets pain in the mid and left upper abdomen.  His sleep is poor due to the trouble breathing.  Of note, he did have an inhaler at one point with possible bronchospasm as well, but he has not used that very recently.  He does use sometimes.    Has had some diarrhea recently and has had this off and on in the past as well.  He has a prescription for Bentyl that he takes  "on an as-needed basis and that helps.    He has been eating less than normal and very bland foods like soup and that seems to be helping.    Records reviewed: He is seen [facility] and had upper and lower endoscopies within the last 6 months.  There was an inlet patch in his esophagus and nonspecific gastritis of the stomach.  Extensive lab testing was negative apparently.    The following portions of the patient's history were reviewed and updated as appropriate: allergies, current medications and problem list  Medication Sig     cetirizine (ZYRTEC) 10 MG tablet Take 1 tablet (10 mg total) by mouth daily.     dicyclomine (BENTYL) 20 mg tablet Take 1 tablet (20 mg total) by mouth 3 (three) times a day.     VENTOLIN HFA 90 mcg/actuation inhaler INHALE TWO PUFFS BY MOUTH EVERY SIX HOURS AS NEEDED FOR WHEEZING     clindamycin (CLINDAGEL) 1 % gel Apply to affected area 2 times daily     hydrocortisone 1 % lotion Apply to affected skin 3 times daily as needed. (Patient not taking: Reported on 3/27/2019)     psyllium husk, with sugar, 3.4 gram/7 gram Powd 1 tablespoon in 8 oz water daily (Patient not taking: Reported on 3/27/2019)     No current facility-administered medications on file prior to visit.         Social History     Tobacco Use   Smoking Status Former Smoker     Types: Cigarettes     Last attempt to quit: 10/31/2016     Years since quittin.4   Smokeless Tobacco Current User     Types: Chew       OBJECTIVE:  :  /68   Pulse 98   Temp 97.4  F (36.3  C) (Oral)   Resp 20   Ht 5' 5.75\" (1.67 m)   Wt 154 lb 12 oz (70.2 kg)   SpO2 97%   BMI 25.17 kg/m      Gen:  A&A, NAD  HEENT: Oropharynx pink moist and benign.  Neck:  supple, no sig LAD or thyromegally  CV:  HRRR, no M/R/G  Resp:  CTAB  Abd:  S&NT, no mass  Ext:  W&D, no edema      Lab results:    Office Visit on 2019   Component Date Value Ref Range Status     Bilirubin, Total 2019 1.0  0.0 - 1.0 mg/dL Final     " Bilirubin, Direct 03/27/2019 0.3  <=0.5 mg/dL Final     Protein, Total 03/27/2019 8.2* 6.0 - 8.0 g/dL Final     Albumin 03/27/2019 4.6  3.5 - 5.0 g/dL Final     Alkaline Phosphatase 03/27/2019 71  45 - 120 U/L Final     AST 03/27/2019 26  0 - 40 U/L Final     ALT 03/27/2019 46* 0 - 45 U/L Final     WBC 03/27/2019 4.4  4.0 - 11.0 thou/uL Final     RBC 03/27/2019 6.65* 4.40 - 6.20 mill/uL Final     Hemoglobin 03/27/2019 17.0  14.0 - 18.0 g/dL Final     Hematocrit 03/27/2019 52.0  40.0 - 54.0 % Final     MCV 03/27/2019 78* 80 - 100 fL Final     MCH 03/27/2019 25.5* 27.0 - 34.0 pg Final     MCHC 03/27/2019 32.6  32.0 - 36.0 g/dL Final     RDW 03/27/2019 11.3  11.0 - 14.5 % Final     Platelets 03/27/2019 188  140 - 440 thou/uL Final     MPV 03/27/2019 9.0  7.0 - 10.0 fL Final     Lipase 03/27/2019 15  0 - 52 U/L Final

## 2021-05-27 NOTE — PROGRESS NOTES
OFFICE VISIT NOTE      Assessment & Plan   Eh Cassy is a 39 y.o. male with some unusual abd issues that persist. I am guessing there is more than one thing going on here. He seems to have some GERD, but I also wonder about gall bladder malfunction? With his bowels, does he have a parasite or other infection? He agrees to try metronidazole for presumptive treatment of giardia.  I have not asked about any trauma or abuse history.  Meanwhile he'll eat more small meals - to try to maintain weight.   i'll likely need to refer him back to GI.    Diagnoses and all orders for this visit:    Abdominal pain, generalized  -     Urinalysis-UC if Indicated  -     HM1(CBC and Differential)  -     Comprehensive Metabolic Panel  -     XR Abdomen AP; Future; Expected date: 04/12/2019  -     Amylase  -     XR Chest 2 Views  -     HM1 (CBC with Diff)  -     Erythrocyte Sedimentation Rate  -     C-Reactive Protein  -     Celiac(Gluten)Antibody Panel    Bloating symptom  -     Urinalysis-UC if Indicated  -     HM1(CBC and Differential)  -     Comprehensive Metabolic Panel  -     XR Abdomen AP; Future; Expected date: 04/12/2019  -     Amylase  -     XR Chest 2 Views  -     HM1 (CBC with Diff)  -     Celiac(Gluten)Antibody Panel    Vomiting and diarrhea  -     Urinalysis-UC if Indicated  -     HM1(CBC and Differential)  -     Comprehensive Metabolic Panel  -     XR Abdomen AP; Future; Expected date: 04/12/2019  -     Amylase  -     XR Chest 2 Views  -     HM1 (CBC with Diff)  -     Celiac(Gluten)Antibody Panel  -     metroNIDAZOLE (FLAGYL) 500 MG tablet; Take 1 tablet (500 mg total) by mouth 3 (three) times a day for 7 days.  Dispense: 21 tablet; Refill: 0    Non-intractable vomiting with nausea, unspecified vomiting type  -     ranitidine (ZANTAC) 300 MG capsule; Take 1 capsule (300 mg total) by mouth 2 (two) times a day.  Dispense: 28 capsule; Refill: 1    Abnormal liver function tests    Irritable bowel syndrome with  diarrhea        Vandana Avina MD              Subjective:   Chief Complaint:  Abdominal Pain (pt states burning sensation in stomach & noises, bloating, difficulty breathing sometimes); Fatigue; Nausea; Muscle Pain; and cold hands and feet    39 y.o. male.     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     ranitidine (ZANTAC) 300 MG capsule Take 1 capsule (300 mg total) by mouth 2 (two) times a day.     cetirizine (ZYRTEC) 10 MG tablet Take 1 tablet (10 mg total) by mouth daily.     clindamycin (CLINDAGEL) 1 % gel Apply to affected area 2 times daily     hydrocortisone 1 % lotion Apply to affected skin 3 times daily as needed. (Patient not taking: Reported on 3/27/2019      )     melatonin 3 mg Tab tablet Take 3 mg by mouth at bedtime as needed.     metroNIDAZOLE (FLAGYL) 500 MG tablet Take 1 tablet (500 mg total) by mouth 3 (three) times a day for 7 days.     psyllium husk, with sugar, 3.4 gram/7 gram Powd 1 tablespoon in 8 oz water daily (Patient not taking: Reported on 3/27/2019      )     VENTOLIN HFA 90 mcg/actuation inhaler INHALE TWO PUFFS BY MOUTH EVERY SIX HOURS AS NEEDED FOR WHEEZING       PSFHx: appropriate sections of PMH completed/filled in   Tobacco Status:  He  reports that he quit smoking about 2 years ago. His smoking use included cigarettes. His  "smokeless tobacco use includes chew.    Review of Systems:  No fever.  No rash. All other systems negative except as noted above.    Objective:    /76   Pulse (!) 107   Temp 97.9  F (36.6  C) (Oral)   Resp 12   Ht 5' 5.75\" (1.67 m)   Wt 149 lb 4 oz (67.7 kg)   SpO2 98%   BMI 24.27 kg/m    GENERAL: No acute distress.  Ht: reg s1s2  Lungs: clear with good aeration, no wheezes even with forced aeration  Abd: +BS, soft, ND/tender in upper quadrants, no masses, no CVA tenderness, neg John's sign    Labs pending  CXR clear  KUB: ?inflammation in one part of the bowel with gas, otherwise normal    Spent 40 min face to face with patient with more the 50% spent in counseling, reviewing chart, and coordination of care and discussing abd pain issues, digestion, parasites, etc.    "

## 2021-05-27 NOTE — PATIENT INSTRUCTIONS - HE
Things you can do to improve symptoms of gastritis and/or reflux:  1.  Eat smaller, more frequent meals.  2.  Avoid irritating foods, such as those foods that are spicy, acidic, fried or fatty. Common foods that cause irritation include chocolate, tomatoes or tomato-based sauces, citrus fruits, coffee.  3.  Only drink alcohol in moderation, if at all. Excessive use of alcohol can irritate the mucous lining of your stomach.   4.  Do not smoke or use tobacco products.  5. Consider switching pain relievers. Nonsteroidal anti-inflammatory drugs (NSAIDs), such as naproxen (Aleve), aspirin, and ibuprofen (Motrin, Advil) are irritating to the stomach.  Acetaminophen (Tylenol) is safe for the stomach.  6.  Manage stress. Stress may make your gastritis symptoms worse. Stress may be unavoidable, but you can learn to cope with it. If you have trouble relaxing, consider trying calming activities, such as meditation, yoga or gray chi.

## 2021-05-28 NOTE — PROGRESS NOTES
Assessment: /    Plan:    1. Primary insomnia  amitriptyline (ELAVIL) 25 MG tablet       Begin amitriptyline for sleep.  Recheck if any problem.      Subjective:    HPI:  Tobias Madera is a 39-year-old male presenting with insomnia.  He has been taking melatonin 3 mg.  At times he feels anxious, and his hands and feet tingle.      Review of Systems: No fever or cough.      Current Outpatient Medications   Medication Sig Dispense Refill     clindamycin (CLINDAGEL) 1 % gel Apply to affected area 2 times daily 60 g 2     dicyclomine (BENTYL) 20 mg tablet Take 1 tablet (20 mg total) by mouth 3 (three) times a day. 90 tablet 6     hydrocortisone 1 % lotion Apply to affected skin 3 times daily as needed. 118 mL 1     melatonin 3 mg Tab tablet Take 3 mg by mouth at bedtime as needed.       ranitidine (ZANTAC) 300 MG capsule Take 1 capsule (300 mg total) by mouth 2 (two) times a day. 28 capsule 1     VENTOLIN HFA 90 mcg/actuation inhaler INHALE TWO PUFFS BY MOUTH EVERY SIX HOURS AS NEEDED FOR WHEEZING 18 g 5     amitriptyline (ELAVIL) 25 MG tablet Take 1 tablet (25 mg total) by mouth at bedtime. 30 tablet 11     cetirizine (ZYRTEC) 10 MG tablet Take 1 tablet (10 mg total) by mouth daily. 30 tablet 2     psyllium husk, with sugar, 3.4 gram/7 gram Powd 1 tablespoon in 8 oz water daily (Patient not taking: Reported on 3/27/2019      ) 538 g 11     No current facility-administered medications for this visit.          Objective:    Vitals:    05/03/19 1309   BP: 100/66   Pulse: 98   Resp: 19   Temp: 99.1  F (37.3  C)   SpO2: 100%       Gen:  NAD, VSS  Lungs:  normal  Heart:  normal    PHQ 9 is 6      ADDITIONAL HISTORY SUMMARIZED (2): None.  DECISION TO OBTAIN EXTRA INFORMATION (1): None.   RADIOLOGY TESTS (1): None.  LABS (1): None.  MEDICINE TESTS (1): None.  INDEPENDENT REVIEW (2 each): None.     Total Data Points: 0

## 2021-05-28 NOTE — TELEPHONE ENCOUNTER
Patient notified per provider note below. The patient will discuss coverage with specialty schedulers when they call.

## 2021-05-28 NOTE — TELEPHONE ENCOUNTER
Patient notified. Patient took Metronidazole. The medication did not help at all. Patient would like to know how to proceed?

## 2021-05-28 NOTE — TELEPHONE ENCOUNTER
"Please call Tobias Cassy. Let him know his labs came back very reassuringly. They are normal --no allergies, no celiac, etc.  This is good, but it leaves us with the question-- what is causing his symptoms? I ask him to consider if he has anxiety, depression, or a traumatic event in the past that might still be \"bothering\" him some? When he follows up with Dr. Gates in early May, he can discuss this and if Tobias Madera should go back to a GI specialist.    Has he taken the metronidazole? If so, did it help at all?  "

## 2021-05-28 NOTE — PROGRESS NOTES
Tobias Madera called the CHW back and states that he went to the [facility] office in-person and they told him they cannot help him. Tobias Madera reports he used to pay monthly insurance premiums for [facility] but has not paid them in a month or so and he states that he continues to get bills from [facility]. [facility] stated that [facility] needs to tell them he is no longer wanting the plan before they will stop sending him bills. Tobias Madera reports he has spoken with [facility] and let them know about his situation but that they have not updated [facility].    He reports he needs help applying for [facility] insurance as he is no longer working and neither is his wife.        CHW sent a message to the [facility] for a CCC referral for FRG.

## 2021-05-28 NOTE — TELEPHONE ENCOUNTER
I put in a referral (back to GI). The patient can decide if he wants to return to [facility] or try [facility] or other (per what his insurance will cover -he might need to call his insurance to find out who they cover).

## 2021-05-28 NOTE — PROGRESS NOTES
"CHW contacted Tobias Madera and he reports he continues to get bills from [facility] regarding his premiums     CHW assisted patient to call [facility] who report that they cannot \"force him to pay the premiums\", and they state that he can disregard the bills he is getting as they state it does take time for [facility] to notify them of a cancelled policy.     CHW provided Tobias Madera with the [facility] office address ([address] [place] [address], [id number]) and he states he will go down there and talk to someone in person about his insurance concerns.    W provided a call back number for the CHW ([phone]) and informed him that he can call the CHW back if he feels he wants to meet with the FRG after he goes to the [facility] office in person.    No further outreach will be done at this time by [facility]. Patient has CHW's phone number and will call back with any concerns.     "

## 2021-05-29 NOTE — PROGRESS NOTES
Programs Applying For: Health Insurance   County:  Case #:  [facility] Worker:   Hugo #:   PMI #:   Tracking:   Date Applied:     Outreach:   5/29/2019: FRG called patient to discuss health insurance and to see if he received any information from [employer]. FRG left  and will make outreach call in 1 week.  5/21/2019: FRG called patient to discuss referral. FRG and patient are going to connect in 1 week to allow time for [employer] to call patient back. Patient will either switch to Medical Assistance or we will need to reapply through [employer]. Patient has already reported new income.       Health Insurance Information:       Referral:   Hi,     Patient does speak English and did his best to resolve his insurance issues.     Patient was paying for a [employer] Bronze insurance plan but now is not working and neither is his wife. He would like help applying for health insurance now that they are both unemployed.     He does report he continues to get bills from [employer] asking him to pay his premium. He does report he has contacted [employer] about his job loss but reports getting another bill from [employer]. [facility] states that he did do what he was supposed to do and that they are waiting on [employer] to report to them that he is no longer employed and wanting the [email or web address] plan.       Thank you,     Julia

## 2021-05-29 NOTE — PROGRESS NOTES
Programs Applying For: Health Insurance   County:  Case #:  [facility] Worker:   Hugo #:   PMI #:   Tracking:   Date Applied:     Outreach:   6/5/2019: FRG checked [place], [place] [employer]. Patient will bring insurance card to clinic at next appointment. FRG called patient to confirm insurance is active. Patient was FRG only, taking patient off panel.  This subscriber is eligible for FF: [place] [facility].  Elig Type M2: [place] group II  Eligibility Begin Date: 06/01/2019  Eligibility End Date: --/--/----  This subscriber is eligible for the following service types: Medical Care , Chiropractic , Dental Care , Hospital , Hospital - Inpatient , Hospital - Outpatient , Emergency Services , Pharmacy , Professional (Physician) Visit - Office , Vision (Optometry) , Mental Health , Urgent Care   Prepaid Health Plan   This subscriber receives FF01 - [place] delivered through [employer]. The phone numbers is [phone] ().  Other Eligibility Information      CLOSED ENCOUNTER:  5/29/2019: FRG called patient to discuss health insurance and to see if he received any information from [facility]. FRG left  and will make outreach call in 1 week.  5/21/2019: FRG called patient to discuss referral. FRG and patient are going to connect in 1 week to allow time for [employer] to call patient back. Patient will either switch to Medical Assistance or we will need to reapply through [employer]. Patient has already reported new income.       Health Insurance Information:       Referral:   Hi,     Patient does speak English and did his best to resolve his insurance issues.     Patient was paying for a [employer] Bronze insurance plan but now is not working and neither is his wife. He would like help applying for health insurance now that they are both unemployed.     He does report he continues to get bills from [facility] asking him to pay his premium. He does report he has contacted [employer] about his job loss but  reports getting another bill from [facility]. [facility] states that he did do what he was supposed to do and that they are waiting on [employer] to report to them that he is no longer employed and wanting the [facility] plan.       Thank you,     Julia

## 2021-05-30 VITALS — WEIGHT: 180.25 LBS | BODY MASS INDEX: 29.08 KG/M2

## 2021-05-30 VITALS — HEIGHT: 66 IN | WEIGHT: 172.08 LBS | BODY MASS INDEX: 27.65 KG/M2

## 2021-05-30 VITALS — BODY MASS INDEX: 28.79 KG/M2 | WEIGHT: 179.12 LBS | HEIGHT: 66 IN

## 2021-05-30 VITALS — BODY MASS INDEX: 28.97 KG/M2 | WEIGHT: 180.25 LBS | HEIGHT: 66 IN

## 2021-05-31 VITALS — WEIGHT: 159.75 LBS | HEIGHT: 66 IN | BODY MASS INDEX: 25.67 KG/M2

## 2021-05-31 VITALS — BODY MASS INDEX: 28.89 KG/M2 | HEIGHT: 66 IN | WEIGHT: 179.75 LBS

## 2021-05-31 VITALS — BODY MASS INDEX: 26.6 KG/M2 | HEIGHT: 66 IN | WEIGHT: 165.5 LBS

## 2021-05-31 VITALS — WEIGHT: 163.25 LBS | BODY MASS INDEX: 26.24 KG/M2 | HEIGHT: 66 IN

## 2021-05-31 VITALS — WEIGHT: 179.25 LBS | BODY MASS INDEX: 28.81 KG/M2 | HEIGHT: 66 IN

## 2021-06-01 NOTE — PROGRESS NOTES
Assessment: /    Plan:    1. Primary insomnia         Recheck in 1 year.      Subjective:    HPI:  Eh Cassy is a 39-year-old male presenting for follow-up on insomnia.  He has been taking amitriptyline, which has been helping.    He has also been taking dicyclomine, prescribed by GI due to diarrhea.      Social Hx: He is a PCA, taking care of Saw MACKENZIE Noriega.    Review of Systems: No fever or cough.      Current Outpatient Medications   Medication Sig Dispense Refill     amitriptyline (ELAVIL) 25 MG tablet Take 1 tablet (25 mg total) by mouth at bedtime. 30 tablet 11     dicyclomine (BENTYL) 20 mg tablet Take 20 mg by mouth 3 (three) times a day as needed.       No current facility-administered medications for this visit.          Objective:    Vitals:    09/03/19 1256   BP: 106/70   Pulse: (!) 103   Resp: 20   Temp: 98.5  F (36.9  C)   SpO2: 97%       Gen:  NAD, VSS  Lungs:  normal  Heart:  normal          ADDITIONAL HISTORY SUMMARIZED (2): Reviewed GI note.  DECISION TO OBTAIN EXTRA INFORMATION (1): None.   RADIOLOGY TESTS (1): None.  LABS (1): None.  MEDICINE TESTS (1): None.  INDEPENDENT REVIEW (2 each): None.     Total Data Points: 2

## 2021-06-02 VITALS — WEIGHT: 164 LBS | BODY MASS INDEX: 26.67 KG/M2

## 2021-06-02 VITALS — BODY MASS INDEX: 26.21 KG/M2 | HEIGHT: 66 IN | WEIGHT: 163.08 LBS

## 2021-06-02 VITALS — WEIGHT: 154.75 LBS | HEIGHT: 66 IN | BODY MASS INDEX: 24.87 KG/M2

## 2021-06-02 VITALS — WEIGHT: 164 LBS | HEIGHT: 66 IN | BODY MASS INDEX: 26.36 KG/M2

## 2021-06-02 VITALS — BODY MASS INDEX: 25.11 KG/M2 | HEIGHT: 66 IN | WEIGHT: 156.25 LBS

## 2021-06-02 VITALS — BODY MASS INDEX: 23.99 KG/M2 | HEIGHT: 66 IN | WEIGHT: 149.25 LBS

## 2021-06-02 VITALS — HEIGHT: 66 IN | BODY MASS INDEX: 25.55 KG/M2 | WEIGHT: 159 LBS

## 2021-06-02 VITALS — HEIGHT: 66 IN | BODY MASS INDEX: 25.71 KG/M2 | WEIGHT: 160 LBS

## 2021-06-03 VITALS
OXYGEN SATURATION: 97 % | WEIGHT: 161.5 LBS | RESPIRATION RATE: 20 BRPM | SYSTOLIC BLOOD PRESSURE: 106 MMHG | HEART RATE: 103 BPM | TEMPERATURE: 98.5 F | BODY MASS INDEX: 25.96 KG/M2 | HEIGHT: 66 IN | DIASTOLIC BLOOD PRESSURE: 70 MMHG

## 2021-06-03 VITALS — WEIGHT: 153.25 LBS | HEIGHT: 66 IN | BODY MASS INDEX: 24.63 KG/M2

## 2021-06-04 VITALS
HEART RATE: 104 BPM | SYSTOLIC BLOOD PRESSURE: 108 MMHG | HEIGHT: 66 IN | RESPIRATION RATE: 16 BRPM | DIASTOLIC BLOOD PRESSURE: 78 MMHG | WEIGHT: 170.75 LBS | TEMPERATURE: 99.2 F | BODY MASS INDEX: 27.44 KG/M2

## 2021-06-04 NOTE — PROGRESS NOTES
"  Chief Complaint   Patient presents with     Sore Throat     for 1 week and also feeling fatigue for two days now.          HPI:   Tobias Madera is a 39 y.o. male has had had sore throat for a few days.  No fever. No ear pain.  No cough.  No stomach pain.  No medication taken.  No one else at home is sick.    ROS:  A 10 point comprehensive review of systems was negative except as noted.      Medications:  Current Outpatient Medications on File Prior to Visit   Medication Sig Dispense Refill     amitriptyline (ELAVIL) 25 MG tablet Take 1 tablet (25 mg total) by mouth at bedtime. 30 tablet 11     dicyclomine (BENTYL) 20 mg tablet Take 20 mg by mouth 3 (three) times a day as needed.       No current facility-administered medications on file prior to visit.          Social History:  Social History     Tobacco Use     Smoking status: Former Smoker     Types: Cigarettes     Last attempt to quit: 10/31/2016     Years since quitting: 3.1     Smokeless tobacco: Former User     Types: Chew   Substance Use Topics     Alcohol use: No         Physical Exam:   Vitals:    12/16/19 1721   BP: 108/78   Pulse: (!) 104   Resp: 16   Temp: 99.2  F (37.3  C)   TempSrc: Oral   Weight: 170 lb 12 oz (77.5 kg)   Height: 5' 6\" (1.676 m)       GENERAL:   Alert. Oriented.  EYES: Clear  HENT:  Ears: R TM pearly gray. Normal landmarks. L TM pearly gray.  Normal landmarks  Nose: Clear.  Sinuses: Nontender.  Oropharynx:  No erythema. No exudate.  NECK: Supple. No adenopathy.  LUNGS: Clear to ascultation.  No crackles.  No wheezing  HEART: RRR  SKIN:  No rash.      LABS:  Results for orders placed or performed in visit on 12/16/19   Rapid Strep A Screen- Throat Swab   Result Value Ref Range    Rapid Strep A Antigen No Group A Strep detected, presumptive negative No Group A Strep detected, presumptive negative        Assessment/Plan:    1. Sore throat  Rapid Strep A Screen- Throat Swab    Group A Strep, RNA Direct Detection, Throat      Viral.  Symptomatic " treatment as needed.    Recheck if worsening or not improving.            Walt Munoz MD      12/16/2019    The following portions of the patient's history were reviewed and updated as appropriate: allergies, current medications, past family history, past medical history, past social history, past surgical history and problem list.

## 2021-06-05 VITALS
BODY MASS INDEX: 28.93 KG/M2 | DIASTOLIC BLOOD PRESSURE: 64 MMHG | HEIGHT: 66 IN | WEIGHT: 180 LBS | RESPIRATION RATE: 24 BRPM | HEART RATE: 120 BPM | SYSTOLIC BLOOD PRESSURE: 120 MMHG | TEMPERATURE: 98.7 F

## 2021-06-05 NOTE — TELEPHONE ENCOUNTER
RN cannot approve Refill Request    RN can NOT refill this medication historical medication requested.      Leah Hull, [facility] Triage/Med Refill 1/29/2020    Requested Prescriptions   Pending Prescriptions Disp Refills     dicyclomine (BENTYL) 20 mg tablet [Pharmacy Med Name: Dicyclomine HCl Oral Tablet 20 MG] 90 tablet 5     Sig: Take 1 tablet (20 mg total) by mouth 3 (three) times a day.       GI Medications Refill Protocol Passed - 1/29/2020  2:43 PM        Passed - PCP or prescribing provider visit in last 12 or next 3 months.     Last office visit with prescriber/PCP: 1/3/2019 Josué Croft MD OR same dept: 12/16/2019 Walt Munoz MD OR same specialty: 12/16/2019 Walt Munoz MD  Last physical: Visit date not found Last MTM visit: Visit date not found   Next visit within 3 mo: Visit date not found  Next physical within 3 mo: Visit date not found  Prescriber OR PCP: Josué Croft MD  Last diagnosis associated with med order: There are no diagnoses linked to this encounter.  If protocol passes may refill for 12 months if within 3 months of last provider visit (or a total of 15 months).

## 2021-06-07 NOTE — TELEPHONE ENCOUNTER
Refill Approved    Rx renewed per Medication Renewal Policy. Medication was last renewed on 5/3/19.    Leah Hull, [facility] Triage/Med Refill 5/4/2020     Requested Prescriptions   Pending Prescriptions Disp Refills     amitriptyline (ELAVIL) 25 MG tablet [Pharmacy Med Name: Amitriptyline HCl Oral Tablet 25 MG] 30 tablet 0     Sig: Take 1 tablet by mouth at bedtime.       Tricyclics/Misc Antidepressant/Antianxiety Meds Refill Protocol Passed - 5/2/2020  2:49 PM        Passed - PCP or prescribing provider visit in last year     Last office visit with prescriber/PCP: 9/3/2019 Patricio Gates MD OR same dept: 12/16/2019 Walt Munoz MD OR same specialty: 12/16/2019 Walt Munoz MD  Last physical: Visit date not found Last MTM visit: Visit date not found   Next visit within 3 mo: Visit date not found  Next physical within 3 mo: Visit date not found  Prescriber OR PCP: Patricio Gates MD  Last diagnosis associated with med order: 1. Primary insomnia  - amitriptyline (ELAVIL) 25 MG tablet [Pharmacy Med Name: Amitriptyline HCl Oral Tablet 25 MG]; Take 1 tablet by mouth at bedtime.  Dispense: 30 tablet; Refill: 0    If protocol passes may refill for 12 months if within 3 months of last provider visit (or a total of 15 months).

## 2021-06-07 NOTE — TELEPHONE ENCOUNTER
"Patient calling - says he has sore throat and vomiting.  Vomited twice this morning.  Sore throat started last night.  Throat \"hurts a little bit\" when he swallows - rates pain 2/10.    No difficulty breathing.  No difficulty swallowing.  No abdominal pain.  No fever. No blood or black coffee ground material in vomit.    Triaged to disposition of Home Care.  Care advice given per protocol: Sip warm chicken broth or warm apple juice, such on hard candy or throat lozenge, gargle with salt water four times a day and avoid cigarette smoke for sore throat relief.  Drink enough to stay hydrated.  Sip small amounts of liquid frequently for 8 hours rather than trying to drink a lot of liquid all at one time to prevent vomiting.  After 8 hours with no vomiting, a bland footds such as saltine crackers, white bread, rice, cereal.    Advised to call back if vomiting last more than 2 days, abdominal pain occurs, signs of dehydration occur, throat pain lasts more than 2 days with no cold symptoms or he becomes worse.    Naima You RN  Triage Nurse Advisor    COVID 19 Nurse Triage Plan/Patient Instructions    Please be aware that novel coronavirus (COVID-19) may be circulating in the community. If you develop symptoms such as fever, cough, or SOB or if you have concerns about the presence of another infection including coronavirus (COVID-19), please contact your health care provider or visit [email or web address].     Disposition/Instructions    Patient to stay at home and follow home care protocol based instructions.    Thank you for limiting contact with others, wearing a simple mask to cover your cough, practice good hand hygiene habits and accessing our [facility] services where possible to limit the spread of this virus.    For more information about COVID19 and options for caring for yourself at home, please visit the CDC website at https://www.cdc.gov/coronavirus/2019-ncov/about/steps-when-sick.html  For more options for care at [facility], please visit our website at [email or web address]    For more information, please use the [place] Department of Health COVID-19 Website: https://www.health.[facility].[place]/diseases/coronavirus/index.html  [place] Department of [place] ([facility]) COVID-19 Hotlines (Interpreters available):      Health questions: Phone Number: [phone] or [phone] and Hours: 7 a.m. to 7 p.m.    Schools and  questions: Phone Number: [phone] or [phone] and Hours 7 a.m. to 7 p.m.      Reason for Disposition    [1] Sore throat is the only symptom AND [2] sore throat present < 48 hours    MILD or MODERATE vomiting (e.g., 1 - 5 times / day)    Protocols used: SORE THROAT-A-AH, VOMITING-A-AH

## 2021-06-08 NOTE — PROGRESS NOTES
Assessment: /    Plan:    1. Strep pharyngitis  amoxicillin (AMOXIL) 875 MG tablet   2. Sorethroat  Rapid Strep A Screen-Throat       Recheck if symptoms are not improving.  Resume Metamucil when his throat feels better.  Patient was seen with Alma , Diego Cortez.      Subjective:    HPI:  Tobias Madera is a 36-year-old male presenting with a sore throat.  This has been occurring for 3 days.  Symptoms have been worsening.  He has associated nausea.  Appetite is decreased.  He has been drinking liquids normally.    He states that his diarrhea improved when taking Metamucil.  When he developed a sore throat, however, Metamucil has irritated the throat.  He has not taken any in the last few days.    Social Hx:  He smokes a cigarette occasionally.    Review of Systems:  No fever, cough, vomiting.      Current Outpatient Prescriptions   Medication Sig Dispense Refill     amoxicillin (AMOXIL) 875 MG tablet Take 1 tablet (875 mg total) by mouth 2 (two) times a day for 10 days. 20 tablet 0     psyllium husk, with sugar, 3.4 gram/7 gram Powd 1 tablespoon in 8 oz water daily 538 g 11     No current facility-administered medications for this visit.          Objective:    Vitals:    02/03/17 1703   BP: 104/80   Pulse: (!) 104   Resp: 18   Temp: 97.8  F (36.6  C)   SpO2: 97%       Gen:  NAD, VSS  Ears normal  Throat red  Neck supple without adenopathy  Lungs:  normal  Heart:  normal  Abdomen:  No HSM, mass or tenderness    Rapid strep positive    ADDITIONAL HISTORY SUMMARIZED (2): None.  DECISION TO OBTAIN EXTRA INFORMATION (1): None.   RADIOLOGY TESTS (1): None.  LABS (1): Strep.  MEDICINE TESTS (1): None.  INDEPENDENT REVIEW (2 each): None.     Total Data Points: 1

## 2021-06-08 NOTE — PROGRESS NOTES
Assessment: /    Plan:    1. Irritable bowel syndrome with diarrhea  psyllium husk, with sugar, 3.4 gram/7 gram Powd       Add Metamucil for fiber.  Eats bananas.  Avoid spicy foods.  Recheck as needed.  Patient was seen with Alma , Tito Pinedo.      Subjective:    HPI:  Tobias Madera is a 36-year-old male presenting for evaluation of diarrhea.  He states that he has loose stools 2-3 times per day ever since age 17.  He occasionally has vomiting.  He eats fish paste most days, and chili peppers.  He eats some meat.  He eats little fruit or vegetables.        Review of Systems:  No epigastric burning, no dysuria.  No blood in the stool.      Current Outpatient Prescriptions   Medication Sig Dispense Refill     FLUCELVAX QUAD 6692-7086, PF, 60 mcg (15 mcg x 4)/0.5 mL Syrg   0     psyllium husk, with sugar, 3.4 gram/7 gram Powd 1 tablespoon in 8 oz water daily 538 g 11     No current facility-administered medications for this visit.          Objective:    Vitals:    01/25/17 1413   BP: 112/76   Pulse: (!) 102   Resp: 18   Temp: 98.3  F (36.8  C)   SpO2: 98%       Gen:  NAD, VSS  Lungs:  normal  Heart:  normal  Abdomen:  No HSM, mass or tenderness        ADDITIONAL HISTORY SUMMARIZED (2): None.  DECISION TO OBTAIN EXTRA INFORMATION (1): None.   RADIOLOGY TESTS (1): None.  LABS (1): None.  MEDICINE TESTS (1): None.  INDEPENDENT REVIEW (2 each): None.     Total Data Points: 0

## 2021-06-08 NOTE — TELEPHONE ENCOUNTER
Sore throat at first, but now is gone     No fever , had one time vomiting yesterday.    Still nausea.     Gatoraid, cracker , no spicey food.

## 2021-06-09 NOTE — PROGRESS NOTES
Assessment: /    Plan:    1. Non-intractable vomiting with nausea, unspecified vomiting type  H. pylori Antibody, IgG       Hold milk for a few days.  Recheck in one month, or sooner if any problems.  Patient was seen with Alma , Mauricio Santana.      Subjective:    HPI:  Tobais Madera is a 37-year-old male presenting for evaluation of vomiting and diarrhea.  He had vomiting 3/11/17 - 3/13/17.  He has experienced bloating after meals.  He has not had pain.  He is partly back to usual.  He has been having diarrhea.        Review of Systems:  No fever or cough.      Current Outpatient Prescriptions   Medication Sig Dispense Refill     psyllium husk, with sugar, 3.4 gram/7 gram Powd 1 tablespoon in 8 oz water daily 538 g 11     No current facility-administered medications for this visit.          Objective:    Vitals:    03/20/17 1522   BP: 120/88   Pulse: 88   Resp: 24   Temp: 98  F (36.7  C)       Gen:  NAD, VSS  Lungs:  normal  Heart:  normal  Abdomen:  No HSM, mass or tenderness        ADDITIONAL HISTORY SUMMARIZED (2): None.  DECISION TO OBTAIN EXTRA INFORMATION (1): None.   RADIOLOGY TESTS (1): None.  LABS (1): H. pylori.  MEDICINE TESTS (1): None.  INDEPENDENT REVIEW (2 each): None.     Total Data Points: 1

## 2021-06-10 NOTE — PROGRESS NOTES
Assessment: /    Plan:    1. H. pylori infection  amoxicillin (AMOXIL) 500 MG capsule    clarithromycin (BIAXIN) 500 MG tablet    omeprazole (PRILOSEC) 20 MG capsule       I explained the need to take all 3 medications concurrently.    Recheck if any problems.  Patient was seen with Alma , Denis Talavera.      Subjective:    HPI:  Tobias Madera is a 37-year-old male presenting for follow-up on H pylori.  Test was positive.      Review of Systems: No vomiting or melena.      Current Outpatient Prescriptions   Medication Sig Dispense Refill     psyllium husk, with sugar, 3.4 gram/7 gram Powd 1 tablespoon in 8 oz water daily 538 g 11     amoxicillin (AMOXIL) 500 MG capsule Take 2 capsules (1,000 mg total) by mouth 2 (two) times a day for 14 days. 56 capsule 0     clarithromycin (BIAXIN) 500 MG tablet Take 1 tablet (500 mg total) by mouth 2 (two) times a day for 14 days. 28 tablet 0     omeprazole (PRILOSEC) 20 MG capsule Take 1 capsule (20 mg total) by mouth 2 (two) times a day for 14 days. 28 capsule 0     No current facility-administered medications for this visit.          Objective:    Vitals:    04/21/17 1447   BP: 100/74   Pulse: 87   Resp: 18   Temp: 98.2  F (36.8  C)   SpO2: 98%       Gen:  NAD, VSS  Lungs:  normal  Heart:  normal  Abdomen:  No HSM, mass or tenderness        ADDITIONAL HISTORY SUMMARIZED (2): None.  DECISION TO OBTAIN EXTRA INFORMATION (1): None.   RADIOLOGY TESTS (1): None.  LABS (1): H. pylori.  MEDICINE TESTS (1): None.  INDEPENDENT REVIEW (2 each): None.     Total Data Points: 1

## 2021-06-11 NOTE — PROGRESS NOTES
Assessment: /    Plan:    1. Pain of upper abdomen  US Abdomen Limited    HM1(CBC and Differential)    Comprehensive Metabolic Panel    Lipase    HM1 (CBC with Diff)   2. Irritable bowel syndrome with diarrhea  psyllium husk, with sugar, 3.4 gram/7 gram Powd       Recheck a few days after the ultrasound for follow-up.  Patient was seen with Alma , Wes Johansen.  This was a 25 minute visit with >50% of the time spent in counseling and coordination of care regarding: Abdominal pain, irritable bowel syndrome.      Subjective:    HPI:  Tobias Madera is a 37-year-old male presenting with abdominal pain and bloating.  He has associated fatigue.  Bowels have been moving normally.  He ran out of psyllium.  He drinks 3-4 cups of water per day.  He drinks little milk.  He eats fruits and vegetables.  He has increased abdominal bloating after eating pork.  I reviewed previous records from care everywhere.       Review of Systems: No fever, vomiting, melena.      Current Outpatient Prescriptions   Medication Sig Dispense Refill     psyllium husk, with sugar, 3.4 gram/7 gram Powd 1 tablespoon in 8 oz water daily 538 g 11     No current facility-administered medications for this visit.          Objective:    Vitals:    06/26/17 1458   BP: 106/70   Pulse: 99   Resp: 18   Temp: 97.4  F (36.3  C)   SpO2: 98%       Gen:  NAD, VSS  Eyes without conjunctival pallor  Lungs:  normal  Heart:  normal  Abdomen:  No HSM, mass or tenderness        ADDITIONAL HISTORY SUMMARIZED (2): Reviewed note from 3/10/2013.  DECISION TO OBTAIN EXTRA INFORMATION (1): None.   RADIOLOGY TESTS (1): Ultrasound ordered.  LABS (1): Ordered.  MEDICINE TESTS (1): None.  INDEPENDENT REVIEW (2 each): None.     Total Data Points: 4

## 2021-06-11 NOTE — PROGRESS NOTES
Assessment: /    Plan:    1. Elevated glucose  Glycosylated Hemoglobin A1c   2. Pain of upper abdomen         Decrease calorie intake and walk daily in order to control glucose levels.    Patient was seen with Alma , Clem Barrios.      Subjective:    HPI:  Tobias Madera is a 37-year-old male presenting for follow-up on ultrasound.  Right upper quadrant ultrasound was normal.  CMP demonstrated glucose of 188.      Review of Systems: No fever or cough.      Current Outpatient Prescriptions   Medication Sig Dispense Refill     psyllium husk, with sugar, 3.4 gram/7 gram Powd 1 tablespoon in 8 oz water daily 538 g 11     No current facility-administered medications for this visit.          Objective:    Vitals:    06/30/17 1611   BP: 100/80   Pulse: (!) 101   Resp: 18   Temp: 98.5  F (36.9  C)   SpO2: 97%       Gen:  NAD, VSS  Lungs:  normal  Heart:  normal  Abdomen:  No HSM, mass or tenderness    A1c is 6.0    ADDITIONAL HISTORY SUMMARIZED (2): None.  DECISION TO OBTAIN EXTRA INFORMATION (1): None.   RADIOLOGY TESTS (1): Reviewed ultrasound.  LABS (1): A1c.  MEDICINE TESTS (1): None.  INDEPENDENT REVIEW (2 each): None.     Total Data Points: 2

## 2021-06-13 NOTE — PROGRESS NOTES
Assessment: /    Plan:    1. Screening for tuberculosis  QTF-Mycobacterium tuberculosis by QuantiFERON-TB Gold       Fax result of test to [facility].  Patient will return in 1 month to follow-up on A1c.  Patient was seen with Alma , Figueroa Little.      Subjective:    HPI:  Tobias Madera is a 37-year-old male presenting for tuberculosis testing.  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  [facility]  He has not been treated for TB.  He does not know anyone who has TB.    He has been decreasing his calorie intake and walking on a regular basis this summer in order to lose weight.      Review of Systems: No fever, cough, chills.      Current Outpatient Prescriptions   Medication Sig Dispense Refill     NATURAL FIBER LAXATIVE THERAPY Powd   11     psyllium husk, with sugar, 3.4 gram/7 gram Powd 1 tablespoon in 8 oz water daily 538 g 11     No current facility-administered medications for this visit.          Objective:    Vitals:    09/13/17 1425   BP: 110/70   Pulse: (!) 102   Resp: 19   Temp: 97.9  F (36.6  C)   SpO2: 97%       Gen:  NAD, VSS.  Weight has decreased 20 pounds since June.  Lungs:  normal  Heart:  normal          ADDITIONAL HISTORY SUMMARIZED (2): None.  DECISION TO OBTAIN EXTRA INFORMATION (1): None.   RADIOLOGY TESTS (1): None.  LABS (1): QuantiFERON.  MEDICINE TESTS (1): None.  INDEPENDENT REVIEW (2 each): None.     Total Data Points: 1

## 2021-06-13 NOTE — PROGRESS NOTES
Assessment: /    Plan:    1. Positive QuantiFERON-TB Gold test  Hepatic Profile    XR Chest PA and Lateral    Ambulatory referral to [facility]   2. Elevated glucose  Glycosylated Hemoglobin A1c    Influenza, Seasonal,Quad Inj, 36+ MOS   3. Need for immunization against influenza  Influenza, Seasonal,Quad Inj, 36+ MOS       Latent TB.  Referring to public health.  Further follow-up depending upon lab results.  Patient was seen with Alma , Karol Mckeon.      Subjective:    HPI:  Tobias Madera is a 37-year-old male presenting for follow-up on elevated glucose.  He has lost 16 pounds since June by eating carefully and exercising.    His QuantiFERON test was positive.  It had been negative 10/1/12.     He had mildly elevated ALT at 66 in June.  Right upper quadrant ultrasound then was normal.  Hep B surface antigen was negative in 2012.  He has had hepatitis B vaccine ×3.  Hep C was negative in 2012.  Hep A was positive in 2012.      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    Review of Systems: No fever, cough, sweats, exposure to TB.      Current Outpatient Prescriptions   Medication Sig Dispense Refill     NATURAL FIBER LAXATIVE THERAPY Powd   11     psyllium husk, with sugar, 3.4 gram/7 gram Powd 1 tablespoon in 8 oz water daily 538 g 11     No current facility-administered medications for this visit.          Objective:    Vitals:    10/13/17 1003   BP: 110/72   Pulse: 85   Resp: 19   Temp: 98.1  F (36.7  C)   SpO2: 98%       Gen:  NAD, VSS  Lungs:  normal  Heart:  normal  Abdomen:  No HSM, mass or tenderness    Chest x-ray is normal.    A1c today is 5.5, improved from 6.0 in June.        ADDITIONAL HISTORY SUMMARIZED (2): None.  DECISION TO OBTAIN EXTRA INFORMATION (1): None.   RADIOLOGY TESTS (1): Chest x-ray.  LABS (1): A1c, hepatic panel.  MEDICINE TESTS (1): None.  INDEPENDENT REVIEW (2 each): I personally interpreted today's chest x-ray.     Total Data Points: 4

## 2021-06-14 NOTE — PROGRESS NOTES
"ASSESSMENT:  1. Elevated LFTs      2. Need for vaccination    - MMR vaccine subcutaneous        PLAN:  Recheck in the Spring.  Patient was seen with professional Alma , Fernie Noriega.      Orders Placed This Encounter   Procedures     MMR vaccine subcutaneous     There are no discontinued medications.    No Follow-up on file.    CHIEF COMPLAINT:  Chief Complaint   Patient presents with     Immunizations update       HISTORY OF PRESENT ILLNESS:  Tobias Madera is a 37 y.o. male presenting to the clinic today to address his need for immunizations, and f/u labs.  AST was 47 and ALT 94 in October.  He had 1 MMR previously.  Lab testing at [facility] in 2012 demonstrated that he immune to hepatitis A.    He will need a record of a chest-xray, and a history of immunizations for his workplace.     He discontinued drinking beer 4-5 months ago, and states that his alcohol use has always been infrequent.       REVIEW OF SYSTEMS:   No fever or cough.  All other systems are negative.      TOBACCO USE:  History   Smoking Status     Former Smoker     Types: Cigarettes     Quit date: 10/31/2016   Smokeless Tobacco     Current User     Types: Chew     Comment: 1 cigarret ocasional.       VITALS:  Vitals:    11/08/17 1333   BP: 100/64   Patient Site: Left Arm   Patient Position: Sitting   Cuff Size: Adult Regular   Pulse: 98   Resp: 19   Temp: 98.2  F (36.8  C)   TempSrc: Oral   SpO2: 97%   Weight: 165 lb 8 oz (75.1 kg)   Height: 5' 5.75\" (1.67 m)     Wt Readings from Last 3 Encounters:   11/08/17 165 lb 8 oz (75.1 kg)   10/13/17 163 lb 4 oz (74 kg)   09/13/17 159 lb 12 oz (72.5 kg)     Body mass index is 26.92 kg/(m^2).    PHYSICAL EXAM:  Constitutional: Alert, cooperative, no distress, appears stated age.  Head: Normocephalic, without obvious abnormality, atraumatic  Lungs: Clear to auscultation bilaterally, respirations unlabored  Heart: Regular rate and rhythm, S1 and S2 normal, no murmur, rub, or gallop,   Abdomen: " Soft, non-tender, bowel sounds normal,  no masses, no organomegaly  Skin: Skin color, texture, turgor normal, no rashes or lesions  Psych: Mood and affect appropriate.     QUALITY MEASURES:    ADDITIONAL HISTORY SUMMARIZED (2): None.  DECISION TO OBTAIN EXTRA INFORMATION (1): None.   RADIOLOGY TESTS (1): None.  LABS (1): Reviewed glycosylated hemoglobin A1C.   MEDICINE TESTS (1): None.  INDEPENDENT REVIEW (2 each): None.     The visit lasted a total of 19 minutes face to face with the patient. Over 50% of the time was spent counseling and educating the patient about immunizations and LFT's.    IJane, am scribing for and in the presence of, Dr. Gates.    Patricio JANE, personally performed the services described in this documentation, as scribed by Jane Lenz in my presence, and it is both accurate and complete.    MEDICATIONS:  Current Outpatient Prescriptions   Medication Sig Dispense Refill     NATURAL FIBER LAXATIVE THERAPY Powd   11     psyllium husk, with sugar, 3.4 gram/7 gram Powd 1 tablespoon in 8 oz water daily 538 g 11     No current facility-administered medications for this visit.        Total data points: 1.

## 2021-06-15 PROBLEM — K58.0 IRRITABLE BOWEL SYNDROME WITH DIARRHEA: Status: ACTIVE | Noted: 2017-01-29

## 2021-06-15 NOTE — PROGRESS NOTES
"    Assessment & Plan      was seen today for tb testing.    Diagnoses and all orders for this visit:    Screening examination for pulmonary tuberculosis  -     QTF-Mycobacterium tuberculosis by QuantiFERON-TB Gold Plus                 BMI:   Estimated body mass index is 29.28 kg/m  as calculated from the following:    Height as of this encounter: 5' 5.75\" (1.67 m).    Weight as of this encounter: 180 lb (81.6 kg).       Return in about 1 year (around 2/12/2022) for Annual physical.    Patricio Gates MD  [facility]    Subjective    Nah is 40 y.o. and presents today for the following health issues   HPI     He took INH and Rifapentin for 12 weeks in 2017.  Had normal CXR.          Review of Systems  No cough, fever, chills, sweats, weight loss.      Objective    /64 (Patient Site: Right Arm, Patient Position: Sitting, Cuff Size: Adult Regular)   Pulse (!) 120   Temp 98.7  F (37.1  C) (Oral)   Resp 24   Ht 5' 5.75\" (1.67 m)   Wt 180 lb (81.6 kg)   BMI 29.28 kg/m    Body mass index is 29.28 kg/m .  Physical Exam  Heart normal  Lungs normal              "

## 2021-06-15 NOTE — TELEPHONE ENCOUNTER
New Appointment Needed  What is the reason for the visit:    Mantoux Placement  Appt Request  What is the purpose of the mantoux?:  Work: [employer]  Is there a form to be completed?:   No  How soon do you need the mantoux placed?:  date: as soon as possible  Provider Preference: Any available  How soon do you need to be seen?: next week  Waitlist offered?: No  Okay to leave a detailed message:  Yes

## 2021-06-16 PROBLEM — Z22.7 LATENT TUBERCULOSIS: Status: ACTIVE | Noted: 2017-10-18

## 2021-06-16 PROBLEM — F51.01 PRIMARY INSOMNIA: Status: ACTIVE | Noted: 2019-05-03

## 2021-06-19 NOTE — LETTER
Letter by Vandana Avina MD at      Author: Vandana Avina MD Service: -- Author Type: --    Filed:  Encounter Date: 5/5/2019 Status: (Other)         Tobias Madera  [address]  [place] [address]             May 5, 2019         Dear Mr. Madera,    Below are the results from your last visit:    Resulted Orders   Comprehensive Metabolic Panel   Result Value Ref Range    Sodium 142 136 - 145 mmol/L    Potassium 3.9 3.5 - 5.0 mmol/L    Chloride 105 98 - 107 mmol/L    CO2 27 22 - 31 mmol/L    Anion Gap, Calculation 10 5 - 18 mmol/L    Glucose 90 70 - 125 mg/dL    BUN 11 8 - 22 mg/dL    Creatinine 1.02 0.70 - 1.30 mg/dL    GFR MDRD Af Amer >60 >60 mL/min/1.73m2    GFR MDRD Non Af Amer >60 >60 mL/min/1.73m2    Bilirubin, Total 1.3 (H) 0.0 - 1.0 mg/dL    Calcium 10.4 8.5 - 10.5 mg/dL    Protein, Total 8.4 (H) 6.0 - 8.0 g/dL    Albumin 4.5 3.5 - 5.0 g/dL    Alkaline Phosphatase 86 45 - 120 U/L    AST 41 (H) 0 - 40 U/L    ALT 90 (H) 0 - 45 U/L    Narrative    Fasting Glucose reference range is 70-99 mg/dL per  American Diabetes Association (ADA) guidelines.   IgE Allergen Panel Regular ($$$)   Result Value Ref Range    Aspergillus fumigatus IgE <0.35 <0.35 kU/L    Alternaria Alternata IgE <0.35 <0.35 kU/L    Cladosporium herbarum IgE <0.35 <0.35 kU/L    Cat Dander IgE <0.35 <0.35 kU/L    Common Ragweed IgE <0.35 <0.35 kU/L    D farinae IgE <0.35 <0.35 kU/L    Dog Dander <0.35 <0.35 kU/L    Elm Tree IgE <0.35 <0.35 kU/L    Giant Ragweed IgE <0.35 <0.35 kU/L    [place] Dust IgE <0.35 <0.35 kU/L    [place] Blue Grass IgE <0.35 <0.35 kU/L    Maple/[place] IgE <0.35 <0.35 kU/L    Oak IgE <0.35 <0.35 kU/L    Orchard Grass IgE <0.35 <0.35 kU/L    English Plantain IgE <0.35 <0.35 kU/L    Dung Grass IgE <0.35 <0.35 kU/L    Narrative    ImmunoCAP Specific IgE Blood Test Quantitative Scoring                        IgE  (kU/L  Level  -----------------------------------------------------------------------------    <0.35   Absent/undetectable    0.35-0.69  Low    0.70-3.49  Moderate    3.50-17.49  High    >=17.50  Very High  -----------------------------------------------------------------------------  *Please note, a high or low specific IgE level may not   necessarily correlate to the degree of symptom severity,   as each person's symptomatic threshold varies.     Latex, IgE   Result Value Ref Range    Latex IgE <0.35 <0.35 kU/L    Narrative    ImmunoCAP Specific IgE Blood Test Quantitative Scoring                        IgE (kU/L  Level  -----------------------------------------------------------------------------    <0.35   Absent/undetectable    0.35-0.69  Low    0.70-3.49  Moderate    3.50-17.49  High    >=17.50  Very High  -----------------------------------------------------------------------------  *Please note, a high or low specific IgE level may not   necessarily correlate to the degree of symptom severity,   as each person's symptomatic threshold varies.     Dust, House, [place] IgE   Result Value Ref Range    Bird [place] Hand House Dust IgE <0.35 <0.35 kU/L    Narrative    ImmunoCAP Specific IgE Blood Test Quantitative Scoring                        IgE (kU/L  Level  -----------------------------------------------------------------------------    <0.35   Absent/undetectable    0.35-0.69  Low    0.70-3.49  Moderate    3.50-17.49  High    >=17.50  Very High  -----------------------------------------------------------------------------  *Please note, a high or low specific IgE level may not   necessarily correlate to the degree of symptom severity,   as each person's symptomatic threshold varies.     Dust, House, Gomez IgE   Result Value Ref Range    Dust, House, Gomez IgE <0.35 <0.35 kU/L    Narrative    ImmunoCAP Specific IgE Blood Test Quantitative Scoring                        IgE  (kU/L  Level  -----------------------------------------------------------------------------    <0.35   Absent/undetectable    0.35-0.69  Low    0.70-3.49  Moderate    3.50-17.49  High    >=17.50  Very High  -----------------------------------------------------------------------------  *Please note, a high or low specific IgE level may not   necessarily correlate to the degree of symptom severity,   as each person's symptomatic threshold varies.     HM1 (CBC with Diff)   Result Value Ref Range    WBC 5.2 4.0 - 11.0 thou/uL    RBC 7.12 (H) 4.40 - 6.20 mill/uL    Hemoglobin 17.9 14.0 - 18.0 g/dL    Hematocrit 56.3 (H) 40.0 - 54.0 %    MCV 79 (L) 80 - 100 fL    MCH 25.1 (L) 27.0 - 34.0 pg    MCHC 31.8 (L) 32.0 - 36.0 g/dL    RDW 14.6 (H) 11.0 - 14.5 %    Platelets 230 140 - 440 thou/uL    MPV 11.2 8.5 - 12.5 fL    Neutrophils % 71 (H) 50 - 70 %    Lymphocytes % 18 (L) 20 - 40 %    Monocytes % 8 2 - 10 %    Eosinophils % 4 0 - 6 %    Basophils % 0 0 - 2 %    Neutrophils Absolute 3.7 2.0 - 7.7 thou/uL    Lymphocytes Absolute 0.9 0.8 - 4.4 thou/uL    Monocytes Absolute 0.4 0.0 - 0.9 thou/uL    Eosinophils Absolute 0.2 0.0 - 0.4 thou/uL    Basophils Absolute 0.0 0.0 - 0.2 thou/uL   Ferritin   Result Value Ref Range    Ferritin 238 27 - 300 ng/mL   Thyroid Stimulating Hormone (TSH)   Result Value Ref Range    TSH 0.63 0.30 - 5.00 uIU/mL   Glycosylated Hemoglobin A1c   Result Value Ref Range    Hemoglobin A1c 5.7 3.5 - 6.0 %       All of the allergy tests were negative. This is good, but it means at least these allergies are NOT the cause of your health problems.    Please call with questions or contact us using [employer].    Sincerely,        Electronically signed by Vandana Avina MD

## 2021-06-20 NOTE — LETTER
Letter by Venus Pruett RN at      Author: Venus Pruett RN Service: -- Author Type: --    Filed:  Encounter Date: 5/19/2020 Status: (Other)       5/19/2020         Cassy  [address]  [place] [address]    This letter provides a written record that you were tested for COVID-19 on 5/19/20.     Your result was negative.    This means that we didnt find the virus that causes COVID-19 in your sample. A test may show negative when you do actually have the virus. This can happen when the virus is in the early stages of infection, before you feel illness symptoms.    Even if you dont have symptoms, they may still appear. For safety, its very important to follow these rules.    Keep yourself away from others (self-isolation):      Stay home. Dont go to work, school or anywhere else.     Stay in your own room (and use your own bathroom), if you can.    Stay away from others in your home. No hugging, kissing or shaking hands. No visitors.    Clean high touch surfaces often (doorknobs, counters, handles, etc.). Use a household cleaning spray or wipes.    Cover your mouth and nose with a mask, tissue or washcloth to avoid spreading germs.    Wash your hands and face often with soap and water.    Stay in self-isolation until you meet ALL of the guidelines below:    1. You have had no fever for at least 72 hours (that is 3 full days of no fever without the use of medicine that reduces fevers), AND  2. other symptoms (such as cough, shortness of breath) have gotten better, AND  3. at least 10 days have passed since your symptoms first appeared.    Going back to work  Check with your employer for any guidelines to follow for going back to work.    Employers: This document serves as formal notice that your employee tested negative for COVID-19, as of the testing date shown above.    For questions regarding this letter or your Negative COVID-19 result, call [phone] between 8A to 6:30P (M-F) and 10A to  6:30P (weekends).

## 2021-06-21 NOTE — PROGRESS NOTES
Subjective: This patient comes in for evaluation this 38-year-old male, was seen previously and had some cultures done regarding diarrhea O&P were negative C. difficile negative stool culture negative and H. pylori stool antigen negative.    Patient had some elevated liver tests.  Back in 2012 patient had a positive hepatitis B surface antibody negative hepatitis B surface antigen negative hepatitis C.    Labs from last visit showed CBC 7500 hemoglobin 16.1 platelets 165,000.    ALT was 75 AST was normal.    Patient today comes in to follow-up for discussion on that but also discussed some additional symptoms.  He has had some shortness of breath.  He states that he has felt bloated and at times a little short of breath no chest pressure.  Denies any dizziness.    Heart rate was at 104 per the nurse I did check an EKG and he was in normal sinus rhythm rate at 95.  Also get a chest x-ray which was normal.  Additionally thyroid was checked and that came back normal as well please see below.    No fever chills.  He no longer smokes.    Tobacco status: He  reports that he quit smoking about 2 years ago. His smoking use included Cigarettes. His smokeless tobacco use includes Chew.    Patient Active Problem List    Diagnosis Date Noted     Latent tuberculosis 10/18/2017     Irritable bowel syndrome with diarrhea 01/29/2017     Chronic diarrhea 10/12/2012     Vitamin D deficiency 10/12/2012     Microcytic red blood cells 10/12/2012     Abnormal liver function tests 10/05/2012       Current Outpatient Prescriptions   Medication Sig Dispense Refill     clindamycin (CLINDAGEL) 1 % gel Apply to affected area 2 times daily 60 g 2     omeprazole (PRILOSEC) 20 MG capsule Take 1 capsule (20 mg total) by mouth daily before breakfast. 30 capsule 1     psyllium husk, with sugar, 3.4 gram/7 gram Powd 1 tablespoon in 8 oz water daily 538 g 11     psyllium seed, sugar, (NATURAL FIBER LAXATIVE THERAPY) Powd Mix 1 tablespoon of powder in  "8 oz of  Water and drink once daily. 538 g 11     No current facility-administered medications for this visit.        ROS:   10 point review of systems negative other than as outlined above    Objective:    /76  Pulse (!) 105  Ht 5' 6\" (1.676 m)  Wt 164 lb (74.4 kg)  SpO2 98%  BMI 26.47 kg/m2  Body mass index is 26.47 kg/(m^2).      General appearance no acute distress    Neck negative no JVD oropharynx is clear    No scleral icterus    Abdomen soft he feels some bloated feeling but I do not palpate any abnormality.  Back without CVA pain    Lungs are clear no rales or rhonchi heart was regular S1-S2 rate at .  EKG as outlined below normal    TSH was normal    Extremities without edema skin was normal    Chest x-ray was clear.    O2 sat 98%    Results for orders placed or performed in visit on 11/01/18   Thyroid Stimulating Hormone (TSH)   Result Value Ref Range    TSH 0.90 0.30 - 5.00 uIU/mL   Electrocardiogram Perform - Clinic   Result Value Ref Range    SYSTOLIC BLOOD PRESSURE  mmHg    DIASTOLIC BLOOD PRESSURE  mmHg    VENTRICULAR RATE 95 BPM    ATRIAL RATE 95 BPM    P-R INTERVAL 160 ms    QRS DURATION 80 ms    Q-T INTERVAL 332 ms    QTC CALCULATION (BEZET) 417 ms    P Axis 71 degrees    R AXIS 79 degrees    T AXIS 50 degrees    MUSE DIAGNOSIS       Normal sinus rhythm  Normal ECG  No previous ECGs available  Confirmed by JAMILA RAMIRES MD LOC:[facility] ([id number]) on 11/1/2018 5:22:13 PM         Assessment:  1. Irritable bowel syndrome with diarrhea  Ambulatory referral to Gastroenterology    Thyroid Stimulating Hormone (TSH)   2. Elevated LFTs  Ambulatory referral to Gastroenterology   3. Tachycardia  Thyroid Stimulating Hormone (TSH)    Electrocardiogram Perform - Clinic   4. Dyspnea  XR Chest 2 Views     Ongoing irritable bowel symptoms with bloating and diarrhea, also has elevated LFT with ALT at 75.  Refer to gastroenterology.  Negative workup thus far regarding stool cultures    Mild tachycardia " normal labs normal x-ray normal EKG    Dyspnea mild seems to be associated with the bloating will see what is found with the GI workup first    Plan: As outlined above    This transcription uses voice recognition software, which may contain typographical errors.

## 2021-06-21 NOTE — PROGRESS NOTES
OFFICE VISIT NOTE      Assessment & Plan   Tobias Madera is a 38 y.o. male with rash, fatigue and episodic shortness of breath, uncertain etiology  I'm especially worried if he has allergies causing some of this. Will check with CBC and allergy tests.  He will try albuterol inhaler treatment with shortness of breath episodes and see if it helps. Also, see if cetirizine prevents episodes. If not, I would want to investigate if he's having panic attacks.  Rash - cetirizine and topical hydrocortisone prn  Fatigue - check ferritin and hgb, TSH recently was normal  I did no mental health questions this visit, and that aspect of his health, as well as sleeping and eating, should be included upon f/u.    Diagnoses and all orders for this visit:    Wheezing  -     albuterol (PROAIR HFA;PROVENTIL HFA;VENTOLIN HFA) 90 mcg/actuation inhaler; Inhale 2 puffs every 6 (six) hours as needed for wheezing.  Dispense: 1 each; Refill: 0  -     Tubular Spacer  -     cetirizine (ZYRTEC) 10 MG tablet; Take 1 tablet (10 mg total) by mouth daily.  Dispense: 30 tablet; Refill: 2  -     HM1(CBC and Differential)  -     IgE Allergen Panel Regular ($$$)  -     Latex, IgE  -     Dust, House, [place] IgE  -     Dust, House, [place] IgE  -     HM1 (CBC with Diff)    Rash  -     cetirizine (ZYRTEC) 10 MG tablet; Take 1 tablet (10 mg total) by mouth daily.  Dispense: 30 tablet; Refill: 2  -     hydrocortisone 1 % lotion; Apply to affected skin 3 times daily as needed.  Dispense: 118 mL; Refill: 1  -     HM1(CBC and Differential)  -     Comprehensive Metabolic Panel  -     IgE Allergen Panel Regular ($$$)  -     Latex, IgE  -     Dust, House, [place] IgE  -     Dust, House, [place] IgE  -     HM1 (CBC with Diff)  -     Ferritin    SOB (shortness of breath)  -     albuterol (PROAIR HFA;PROVENTIL HFA;VENTOLIN HFA) 90 mcg/actuation inhaler; Inhale 2 puffs every 6 (six) hours as needed for wheezing.  Dispense: 1 each; Refill: 0  -     Tubular Spacer  -      cetirizine (ZYRTEC) 10 MG tablet; Take 1 tablet (10 mg total) by mouth daily.  Dispense: 30 tablet; Refill: 2  -     HM1(CBC and Differential)  -     IgE Allergen Panel Regular ($$$)  -     Latex, IgE  -     Dust, House, [place] IgE  -     Dust, House, Gomez IgE  -     HM1 (CBC with Diff)    Chronic fatigue  -     HM1(CBC and Differential)  -     Comprehensive Metabolic Panel  -     IgE Allergen Panel Regular ($$$)  -     Latex, IgE  -     Dust, House, [place] IgE  -     Dust, House, Gomez IgE  -     HM1 (CBC with Diff)  -     Ferritin  -     Thyroid Stimulating Hormone (TSH)  -     Glycosylated Hemoglobin A1c    Abnormal liver function tests  -     Comprehensive Metabolic Panel    Other orders  -     Influenza, Seasonal Quad, Preservative Free 36+ Months    Patient was given a flu shot    Vandana Avina MD              Subjective:   Chief Complaint:  Shortness of Breath; Rash (all over body, itchy); Fatigue; and poor appetite    38 y.o. male.     1) shortness of breath episodes at work; restarted 11/19;  takes 30-60 min to improve  Back in Oct. This was checked out at [facility]  Given omeprazole  shortness of breath is not tightness, he does not hear a wheeze; but it is difficult to breathe out, he's tired, tingling in fingers, hands and feet get cold  He is not sure what causes this - why is it happening now?    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     omeprazole  "(PRILOSEC) 20 MG capsule Take 1 capsule (20 mg total) by mouth daily before breakfast.     albuterol (PROAIR HFA;PROVENTIL HFA;VENTOLIN HFA) 90 mcg/actuation inhaler Inhale 2 puffs every 6 (six) hours as needed for wheezing.     cetirizine (ZYRTEC) 10 MG tablet Take 1 tablet (10 mg total) by mouth daily.     clindamycin (CLINDAGEL) 1 % gel Apply to affected area 2 times daily     hydrocortisone 1 % lotion Apply to affected skin 3 times daily as needed.     psyllium husk, with sugar, 3.4 gram/7 gram Powd 1 tablespoon in 8 oz water daily       PSFHx: appropriate sections of PMH completed/filled in   Tobacco Status:  He  reports that he quit smoking about 2 years ago. His smoking use included cigarettes. His smokeless tobacco use includes chew.    Review of Systems:  No fever.  No dysuria. All other systems negative except as noted above.    Objective:    /78   Pulse (!) 106   Temp 98.3  F (36.8  C) (Oral)   Resp 12   Ht 5' 6\" (1.676 m)   Wt 163 lb 1.3 oz (74 kg)   SpO2 96%   BMI 26.32 kg/m    GENERAL: No acute distress.  HEENT: eyes clear; Nose without congestion, mouth clean  NECK: supple, no LA  Ht: Reg s1s2 (not tachycardic as he was with vitals)  Lungs: clear with good aeration, slightly prolonged expiration but no wheeze even with forced expiration  Skin: areas of bright red erythema but no swelling or texture, edges are blurred, not distinct, spares palms    Reviewed past EKG - NSR, normal EKG  Reviewed past CXR and labs  Reviewed note from [facility]  Labs pending    Spent 40 min face to face with patient with more the 50% spent in counseling, reviewing chart, and coordination of care and discussing shortness of breath, rash, way to use inhaler, etc.    "

## 2021-06-21 NOTE — PROGRESS NOTES
Subjective: Patient comes in for evaluation.  Has been seeing Dr. Gates.  Patient has past history of latent tuberculosis [facility] with [facility] for 9 months.    Patient has a history of some tonic diarrhea issues, diagnosis has been irritable bowel.  He states that this is happened ongoing.    Patient's had elevated liver tests.  Back in June ALT was 66 he states his he had been drinking alcohol mild and none now    Back in October 2017 AST 47 ALT 94.    Patient complains of some ongoing bloating and did have a positive H. pylori blood titer was treated back in March 2017.    Patient is not on any PPI.    Also has had some acne in through his face chest and back and wondered about treatment.  I elected to hold off on an antibiotic such as doxycycline with his GI symptoms and try some topical clindamycin twice daily.    We will get labs including LFT recheck also CBC was checked    He will bring in stool samples for O&P x3 C. difficile H. pylori antigen and bacterial culture.    Tobacco status: He  reports that he quit smoking about 1 years ago. His smoking use included Cigarettes. His smokeless tobacco use includes Chew.    Patient Active Problem List    Diagnosis Date Noted     Latent tuberculosis 10/18/2017     Irritable bowel syndrome with diarrhea 01/29/2017       Current Outpatient Prescriptions   Medication Sig Dispense Refill     psyllium husk, with sugar, 3.4 gram/7 gram Powd 1 tablespoon in 8 oz water daily 538 g 11     psyllium seed, sugar, (NATURAL FIBER LAXATIVE THERAPY) Powd Mix 1 tablespoon of powder in 8 oz of  Water and drink once daily. 538 g 11     clindamycin (CLINDAGEL) 1 % gel Apply to affected area 2 times daily 60 g 2     omeprazole (PRILOSEC) 20 MG capsule Take 1 capsule (20 mg total) by mouth daily before breakfast. 30 capsule 1     No current facility-administered medications for this visit.        ROS:   10 point review of systems positive as  outlined otherwise negative    Objective:    /74 (Patient Site: Left Arm, Patient Position: Sitting, Cuff Size: Adult Regular)  Pulse (!) 102  Wt 164 lb (74.4 kg)  SpO2 97%  BMI 26.67 kg/m2  Body mass index is 26.67 kg/(m^2).      General appearance no acute distress    Vital signs were stable    Neck negative no adenopathy oropharynx is clear pupils react normally no scleral icterus    Skin without jaundice change    He has some mild acne in through the forehead upper chest and upper back areas.  No cystic change    Lungs are clear throughout no rales or rhonchi heart was regular S1-S2 rate at 9200    Abdomen was soft bowel sounds normal no guarding or rebound he feels some bloating in the upper abdomen.  No masses appreciated.    Extremities without edema.    No additional skin abnormalities.        Patient has elevated ALT at 75 AST 32.    CBC is normal    In June 2017 ultrasound was normal regarding liver.    Results for orders placed or performed in visit on 10/25/18   Hepatic Profile   Result Value Ref Range    Bilirubin, Total 0.7 0.0 - 1.0 mg/dL    Bilirubin, Direct 0.2 <=0.5 mg/dL    Protein, Total 7.7 6.0 - 8.0 g/dL    Albumin 4.2 3.5 - 5.0 g/dL    Alkaline Phosphatase 65 45 - 120 U/L    AST 32 0 - 40 U/L    ALT 75 (H) 0 - 45 U/L   HM1 (CBC with Diff)   Result Value Ref Range    WBC 7.5 4.0 - 11.0 thou/uL    RBC 6.38 (H) 4.40 - 6.20 mill/uL    Hemoglobin 16.1 14.0 - 18.0 g/dL    Hematocrit 50.3 40.0 - 54.0 %    MCV 79 (L) 80 - 100 fL    MCH 25.3 (L) 27.0 - 34.0 pg    MCHC 32.1 32.0 - 36.0 g/dL    RDW 12.0 11.0 - 14.5 %    Platelets 165 140 - 440 thou/uL    MPV 9.6 7.0 - 10.0 fL    Neutrophils % 65 50 - 70 %    Lymphocytes % 24 20 - 40 %    Monocytes % 5 2 - 10 %    Eosinophils % 4 0 - 6 %    Basophils % 1 0 - 2 %    Neutrophils Absolute 4.9 2.0 - 7.7 thou/uL    Lymphocytes Absolute 1.8 0.8 - 4.4 thou/uL    Monocytes Absolute 0.4 0.0 - 0.9 thou/uL    Eosinophils Absolute 0.3 0.0 - 0.4 thou/uL     Basophils Absolute 0.0 0.0 - 0.2 thou/uL       Assessment:  1. Irritable bowel syndrome with diarrhea  HM1(CBC and Differential)    Culture, Stool    Ova and Parasite, Stool    C. difficile Toxigenic by PCR    H. pylori Antigen, Stool(HPSAG)    HM1 (CBC with Diff)   2. Elevated LFTs  Hepatic Profile   3. Bloating symptom  HM1(CBC and Differential)    HM1 (CBC with Diff)   4. Acne  clindamycin (CLINDAGEL) 1 % gel   5. Gastritis  omeprazole (PRILOSEC) 20 MG capsule     Ongoing diarrhea check cultures    Bloating gastritis will treat with omeprazole    Elevated LFTs should get lab workup with AFP hepatitis studies iron studies etc.    Acne treat with clindamycin gel hold off on any antibiotics.    Plan: Await results on stool samples for O&P C. difficile H. pylori and bacteria cultures    Patient be contacted regarding the results and recommendations on further lab workup regarding elevated LFTs    This transcription uses voice recognition software, which may contain typographical errors.

## 2021-06-22 NOTE — PROGRESS NOTES
Assessment: /    Plan:    1. Non-intractable vomiting without nausea, unspecified vomiting type         Continue current medications.  Recheck in 1 month, or sooner if any problem.      Subjective:    HPI:  Tobias Madera is a 38-year-old male presenting with vomiting.  He is feeling slightly better now.  This occurred once on December 24 and once this morning.  He has not had diarrhea.  He takes dicyclomine 3 times daily for irritable bowel syndrome.  I reviewed the note by GI.  I also reviewed the note from October by Dr. Castillo, and from November by Dr. Avina.    He is taking cetirizine daily.  He had a rash on his arm prior to starting that.  He has used Ventolin 3 times, which helped.    He had EGD and colonoscopy.    He occasionally has decreased appetite.    He occasionally has poor sleep.      Review of Systems: No fever or cough.      Current Outpatient Medications   Medication Sig Dispense Refill     cetirizine (ZYRTEC) 10 MG tablet Take 1 tablet (10 mg total) by mouth daily. 30 tablet 2     VENTOLIN HFA 90 mcg/actuation inhaler INHALE TWO PUFFS BY MOUTH EVERY SIX HOURS AS NEEDED FOR WHEEZING 18 g 5     clindamycin (CLINDAGEL) 1 % gel Apply to affected area 2 times daily 60 g 2     dicyclomine (BENTYL) 20 mg tablet Take 1 tablet (20 mg total) by mouth 3 (three) times a day. 90 tablet 6     hydrocortisone 1 % lotion Apply to affected skin 3 times daily as needed. (Patient not taking: Reported on 1/3/2019      ) 118 mL 1     psyllium husk, with sugar, 3.4 gram/7 gram Powd 1 tablespoon in 8 oz water daily (Patient not taking: Reported on 1/3/2019      ) 538 g 11     No current facility-administered medications for this visit.          Objective:    Vitals:    12/31/18 1557   BP: 104/80   Pulse: 100   Temp: 98.4  F (36.9  C)   SpO2: 99%       Gen:  NAD, VSS  Lungs:  normal  Heart:  normal  Abdomen:  No HSM, mass or tenderness        ADDITIONAL HISTORY SUMMARIZED (2): Reviewed notes above.  DECISION TO OBTAIN EXTRA  INFORMATION (1): None.   RADIOLOGY TESTS (1): None.  LABS (1): Reviewed November labs.  MEDICINE TESTS (1): Reviewed EGD and colonoscopy.  INDEPENDENT REVIEW (2 each): None.     Total Data Points: 4

## 2021-06-22 NOTE — PROGRESS NOTES
ASSESMENT AND PLAN:  Diagnoses and all orders for this visit:    Bronchospasm  Good response to combination of antihistamine and albuterol.  His rash has now resolved completely and he will discontinue the antihistamine once this 30-day treatment is completed.  He will continue to use the albuterol as needed.  We discussed indications for restarting antihistamine.    Irritable bowel syndrome with diarrhea  Reviewed his gastroenterology consultation, patient had upper endoscopy and colonoscopy, he had a diagnosis of irritable bowel syndrome.  He has responded well to the dicyclomine and I will refill that for him as prescribed below.  -     dicyclomine (BENTYL) 20 mg tablet  Dispense: 90 tablet; Refill: 6    Abnormal liver function tests  Reviewed his previous lab workup via care everywhere, he did have negative hepatitis B and negative hepatitis C testing done on refugee screening back in 2012 at [email or web address].  Counseled the patient on her fatty liver and alcohol intake and prescribed a healthy low-fat diet for the patient with recommendation to recheck his liver tests in 4-6 months.    Insomnia, unspecified type  Patient.  For now he is going to try exercise and healthy eating and healthy lifestyle changes.  However, if he does not get adequate continued improvement in his sleep then he will try over-the-counter melatonin 3-6 mg at bedtime.  Patient will follow up with his primary care physician Dr. Gates here in the clinic in February.        SUBJECTIVE: 38-year-old male who comes in today with several concerns.  I reviewed the clinic note from my partner Dr. Avina who had seen the patient about 5 weeks ago.  He had a rash at that time and was started on an histamine.  Patient reports the rash has resolved completely.  He also reports that his shortness of breath has responded well to the albuterol.  He is just used it a few times when he has had these episodes of shortness of breath and he gets immediate  improvement in those symptoms with the albuterol.  Patient had been having some issues with abdominal pain and diarrhea.  This was worked up extensively including GI consultation with upper endoscopy and colonoscopy.  Reports that those symptoms have also been improving.  His energy level has been improving.  He still struggling some with sleep, getting 5-6 hours per night.  Patient also has a history of elevated liver enzymes.  He was born in [facility] and came to the [place] as a refugee, arriving in [place] in .  Patient reports that he had his refugee screening done at a health [facility] clinic.  He uses alcohol rarely.    Past Medical History:   Diagnosis Date     TB (tuberculosis) 2017    took latent TB treatment in 2017     Patient Active Problem List   Diagnosis     Irritable bowel syndrome with diarrhea     Latent tuberculosis     Chronic diarrhea     Vitamin D deficiency     Microcytic red blood cells     Abnormal liver function tests     Current Outpatient Medications   Medication Sig Dispense Refill     cetirizine (ZYRTEC) 10 MG tablet Take 1 tablet (10 mg total) by mouth daily. 30 tablet 2     clindamycin (CLINDAGEL) 1 % gel Apply to affected area 2 times daily 60 g 2     dicyclomine (BENTYL) 20 mg tablet Take 1 tablet (20 mg total) by mouth 3 (three) times a day. 90 tablet 6     VENTOLIN HFA 90 mcg/actuation inhaler INHALE TWO PUFFS BY MOUTH EVERY SIX HOURS AS NEEDED FOR WHEEZING 18 g 5     hydrocortisone 1 % lotion Apply to affected skin 3 times daily as needed. (Patient not taking: Reported on 1/3/2019      ) 118 mL 1     psyllium husk, with sugar, 3.4 gram/7 gram Powd 1 tablespoon in 8 oz water daily (Patient not taking: Reported on 1/3/2019      ) 538 g 11     No current facility-administered medications for this visit.      Social History     Tobacco Use   Smoking Status Former Smoker     Types: Cigarettes     Last attempt to quit: 10/31/2016     Years since quittin.1   Smokeless  "Tobacco Current User     Types: Chew   Tobacco Comment    1 cigarret ocasional.       OBJECTICE: BP 90/70   Pulse (!) 103   Temp 97.5  F (36.4  C) (Oral)   Resp 20   Ht 5' 6\" (1.676 m)   Wt 160 lb (72.6 kg)   SpO2 98%   BMI 25.82 kg/m       No results found for this or any previous visit (from the past 24 hour(s)).     GEN-alert, appropriate, in no apparent distress   HEENT-sclera are clear, mucous membranes are moist   CV-regular rate and rhythm with no murmur   RESP-lungs clear to auscultation   ABDOMINAL-soft and nontender to palpation with no palpable mass organomegaly   EXTREM-warm with no ankle edema   SKIN-no rash      Josué Croft          "

## 2021-06-24 NOTE — PROGRESS NOTES
Assessment: /    Plan:    1. Acute streptococcal pharyngitis  amoxicillin (AMOXIL) 875 MG tablet   2. Sore throat  Rapid Strep A Screen- Throat Swab       Note written to be off work tonight.  Recheck if any problem.      Subjective:    HPI:  Tobias Madera is a 38-year-old male presenting with sore throat.  Symptoms began yesterday.  He feels chilled, especially his hands and feet.  He has nausea.      Review of Systems: No vomiting or diarrhea.      Current Outpatient Medications   Medication Sig Dispense Refill     cetirizine (ZYRTEC) 10 MG tablet Take 1 tablet (10 mg total) by mouth daily. 30 tablet 2     clindamycin (CLINDAGEL) 1 % gel Apply to affected area 2 times daily 60 g 2     dicyclomine (BENTYL) 20 mg tablet Take 1 tablet (20 mg total) by mouth 3 (three) times a day. 90 tablet 6     hydrocortisone 1 % lotion Apply to affected skin 3 times daily as needed. 118 mL 1     psyllium husk, with sugar, 3.4 gram/7 gram Powd 1 tablespoon in 8 oz water daily 538 g 11     VENTOLIN HFA 90 mcg/actuation inhaler INHALE TWO PUFFS BY MOUTH EVERY SIX HOURS AS NEEDED FOR WHEEZING 18 g 5     amoxicillin (AMOXIL) 875 MG tablet Take 1 tablet (875 mg total) by mouth 2 (two) times a day for 10 days. 20 tablet 0     No current facility-administered medications for this visit.          Objective:    Vitals:    02/26/19 1324   BP: 108/76   Pulse: (!) 109   Resp: 19   Temp: 98.9  F (37.2  C)   SpO2: 100%       Gen:  NAD, VSS  Ears normal  Throat with mild erythema  Neck supple without adenopathy  Lungs:  normal  Heart:  normal  Abdomen:  No HSM, mass or tenderness    Rapid strep positive    ADDITIONAL HISTORY SUMMARIZED (2): None.  DECISION TO OBTAIN EXTRA INFORMATION (1): None.   RADIOLOGY TESTS (1): None.  LABS (1): Strep.  MEDICINE TESTS (1): None.  INDEPENDENT REVIEW (2 each): None.     Total Data Points: 1

## 2022-03-11 ENCOUNTER — IMMUNIZATION (OUTPATIENT)
Dept: NURSING | Facility: CLINIC | Age: 42
End: 2022-03-11
Payer: COMMERCIAL

## 2022-03-11 PROCEDURE — 91306 COVID-19,PF,MODERNA (18+ YRS BOOSTER .25ML): CPT

## 2022-03-11 PROCEDURE — 0064A COVID-19,PF,MODERNA (18+ YRS BOOSTER .25ML): CPT

## 2022-07-18 ENCOUNTER — DOCUMENTATION ONLY (OUTPATIENT)
Dept: FAMILY MEDICINE | Facility: CLINIC | Age: 42
End: 2022-07-18

## 2023-05-31 ENCOUNTER — OFFICE VISIT (OUTPATIENT)
Dept: FAMILY MEDICINE | Facility: CLINIC | Age: 43
End: 2023-05-31
Payer: COMMERCIAL

## 2023-05-31 VITALS
HEART RATE: 101 BPM | WEIGHT: 159 LBS | TEMPERATURE: 100 F | OXYGEN SATURATION: 98 % | RESPIRATION RATE: 18 BRPM | BODY MASS INDEX: 25.86 KG/M2 | DIASTOLIC BLOOD PRESSURE: 84 MMHG | SYSTOLIC BLOOD PRESSURE: 120 MMHG

## 2023-05-31 DIAGNOSIS — R50.9 FEVER, UNSPECIFIED FEVER CAUSE: ICD-10-CM

## 2023-05-31 DIAGNOSIS — R05.9 COUGH, UNSPECIFIED TYPE: ICD-10-CM

## 2023-05-31 DIAGNOSIS — B97.89 VIRAL SORE THROAT: Primary | ICD-10-CM

## 2023-05-31 DIAGNOSIS — J02.8 VIRAL SORE THROAT: Primary | ICD-10-CM

## 2023-05-31 LAB — DEPRECATED S PYO AG THROAT QL EIA: NEGATIVE

## 2023-05-31 PROCEDURE — 87637 SARSCOV2&INF A&B&RSV AMP PRB: CPT | Performed by: MASSAGE THERAPIST

## 2023-05-31 PROCEDURE — 87651 STREP A DNA AMP PROBE: CPT | Performed by: MASSAGE THERAPIST

## 2023-05-31 PROCEDURE — 99213 OFFICE O/P EST LOW 20 MIN: CPT | Performed by: MASSAGE THERAPIST

## 2023-05-31 RX ORDER — BENZONATATE 100 MG/1
100 CAPSULE ORAL 3 TIMES DAILY PRN
Qty: 30 CAPSULE | Refills: 0 | Status: SHIPPED | OUTPATIENT
Start: 2023-05-31

## 2023-06-01 LAB
FLUAV RNA SPEC QL NAA+PROBE: NEGATIVE
FLUBV RNA RESP QL NAA+PROBE: POSITIVE
GROUP A STREP BY PCR: NOT DETECTED
RSV RNA SPEC NAA+PROBE: NEGATIVE
SARS-COV-2 RNA RESP QL NAA+PROBE: NEGATIVE

## 2023-06-01 NOTE — PROGRESS NOTES
Assessment & Plan     Viral sore throat  Cough, unspecified type  Fever, unspecified fever cause  Centor criteria equals 3 (tender anterior cervical lymphadenopathy, exudate, fever).  Also has recent strep exposure with his wife and child.  Will send strep test.  If positive will send a course of amoxicillin.  Otherwise, reviewed supportive cares and did send a course of Tessalon to help with cough at night.  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              HPI       Acute Illness  Onset/Duration: 2 days   Symptoms:  Fever: YES  Chills/Sweats: YES  Headache (location?): YES  Rhinorrhea: YES  Congestion: YES  Sore Throat: YES  Cough: YES-productive of clear sputum  Nausea: YES  Vomiting: No  Fatigue/Achiness: YES  Sick/Strep Exposure: YES- wife and child treated for strep   Therapies tried and outcome: None    Centor criteria   Tonsillar Exudate: yes  Tender Anterior Cervical Lymphadenopathy: yes  Fever: yes  Absence of Cough: no              Objective    /84   Pulse 101   Temp 100  F (37.8  C) (Tympanic)   Resp 18   Wt 72.1 kg (159 lb)   SpO2 98%   BMI 25.86 kg/m    Body mass index is 25.86 kg/m .  Physical Exam   EXAM  General: Alert, tired-appearing, no acute distress  Head: Nontraumatic   Eyes:  EOM intact   Oropharynx: moist oral mucus membranes, erythema and exudate in posterior oropharynx, tender to palpation of anterior cervical lymph nodes, no palpable nodes  Pulm: CTA BL, no  tachypnea, speaking in full sentences  CV: RRR, no murmur  Abd: soft, NTND, no masses  Ext: Warm, well perfused,  no LE edema  Skin: No rash on limited skin exam  Neuro: moves all 4 extremities  Psych: Mood appropriate to visit content, full affect, rational thought content and process      No results found for this or any previous visit (from the past 24 hour(s)).